# Patient Record
Sex: FEMALE | Race: WHITE | NOT HISPANIC OR LATINO | Employment: FULL TIME | URBAN - METROPOLITAN AREA
[De-identification: names, ages, dates, MRNs, and addresses within clinical notes are randomized per-mention and may not be internally consistent; named-entity substitution may affect disease eponyms.]

---

## 2023-02-06 ENCOUNTER — TRANSFERRED RECORDS (OUTPATIENT)
Dept: MULTI SPECIALTY CLINIC | Facility: CLINIC | Age: 28
End: 2023-02-06

## 2023-02-06 LAB — PAP-ABSTRACT: NORMAL

## 2023-10-02 ENCOUNTER — HOSPITAL ENCOUNTER (EMERGENCY)
Facility: CLINIC | Age: 28
Discharge: HOME | End: 2023-10-03
Attending: EMERGENCY MEDICINE | Admitting: EMERGENCY MEDICINE
Payer: COMMERCIAL

## 2023-10-02 DIAGNOSIS — K60.30 PERIANAL FISTULA: ICD-10-CM

## 2023-10-02 DIAGNOSIS — K61.0 PERIANAL ABSCESS: ICD-10-CM

## 2023-10-02 PROCEDURE — 99285 EMERGENCY DEPT VISIT HI MDM: CPT | Mod: 25

## 2023-10-02 NOTE — ED TRIAGE NOTES
Pt arrives from  for a rectal abscess. States she has a palpable lump that iscausing 8/10 pain. Denies fevers. No drainage noted. ABCs intact.     BP (!) 133/93   Pulse 101   Temp 98.2  F (36.8  C) (Oral)   Resp 20   Wt 60.8 kg (134 lb 0.6 oz)   SpO2 100%        Triage Assessment       Row Name 10/02/23 5143       Triage Assessment (Adult)    Airway WDL WDL       Respiratory WDL    Respiratory WDL WDL       Cardiac WDL    Cardiac WDL WDL       Cognitive/Neuro/Behavioral WDL    Cognitive/Neuro/Behavioral WDL WDL

## 2023-10-03 ENCOUNTER — APPOINTMENT (OUTPATIENT)
Dept: CT IMAGING | Facility: CLINIC | Age: 28
End: 2023-10-03
Attending: EMERGENCY MEDICINE
Payer: COMMERCIAL

## 2023-10-03 ENCOUNTER — NURSE TRIAGE (OUTPATIENT)
Dept: NURSING | Facility: CLINIC | Age: 28
End: 2023-10-03

## 2023-10-03 ENCOUNTER — TELEPHONE (OUTPATIENT)
Dept: SURGERY | Facility: CLINIC | Age: 28
End: 2023-10-03

## 2023-10-03 ENCOUNTER — OFFICE VISIT (OUTPATIENT)
Dept: SURGERY | Facility: CLINIC | Age: 28
End: 2023-10-03
Payer: COMMERCIAL

## 2023-10-03 ENCOUNTER — PATIENT OUTREACH (OUTPATIENT)
Dept: SURGERY | Facility: CLINIC | Age: 28
End: 2023-10-03

## 2023-10-03 VITALS
HEIGHT: 65 IN | OXYGEN SATURATION: 99 % | DIASTOLIC BLOOD PRESSURE: 71 MMHG | SYSTOLIC BLOOD PRESSURE: 121 MMHG | BODY MASS INDEX: 22.31 KG/M2 | HEART RATE: 84 BPM

## 2023-10-03 VITALS
SYSTOLIC BLOOD PRESSURE: 117 MMHG | OXYGEN SATURATION: 97 % | DIASTOLIC BLOOD PRESSURE: 77 MMHG | WEIGHT: 134.04 LBS | HEART RATE: 76 BPM | TEMPERATURE: 98.2 F | RESPIRATION RATE: 20 BRPM

## 2023-10-03 DIAGNOSIS — K61.0 PERIANAL ABSCESS: ICD-10-CM

## 2023-10-03 DIAGNOSIS — K60.30 PERIANAL FISTULA: Primary | ICD-10-CM

## 2023-10-03 LAB
ANION GAP SERPL CALCULATED.3IONS-SCNC: 12 MMOL/L (ref 7–15)
BASO+EOS+MONOS # BLD AUTO: ABNORMAL 10*3/UL
BASO+EOS+MONOS NFR BLD AUTO: ABNORMAL %
BASOPHILS # BLD AUTO: 0.1 10E3/UL (ref 0–0.2)
BASOPHILS NFR BLD AUTO: 0 %
BUN SERPL-MCNC: 11.2 MG/DL (ref 6–20)
CALCIUM SERPL-MCNC: 9.4 MG/DL (ref 8.6–10)
CHLORIDE SERPL-SCNC: 102 MMOL/L (ref 98–107)
CREAT SERPL-MCNC: 0.73 MG/DL (ref 0.51–0.95)
DEPRECATED HCO3 PLAS-SCNC: 20 MMOL/L (ref 22–29)
EGFRCR SERPLBLD CKD-EPI 2021: >90 ML/MIN/1.73M2
EOSINOPHIL # BLD AUTO: 0.1 10E3/UL (ref 0–0.7)
EOSINOPHIL NFR BLD AUTO: 1 %
ERYTHROCYTE [DISTWIDTH] IN BLOOD BY AUTOMATED COUNT: 12.5 % (ref 10–15)
GLUCOSE SERPL-MCNC: 87 MG/DL (ref 70–99)
HCG SER QL IA.RAPID: NEGATIVE
HCT VFR BLD AUTO: 40 % (ref 35–47)
HGB BLD-MCNC: 13.4 G/DL (ref 11.7–15.7)
IMM GRANULOCYTES # BLD: 0.1 10E3/UL
IMM GRANULOCYTES NFR BLD: 0 %
LYMPHOCYTES # BLD AUTO: 2 10E3/UL (ref 0.8–5.3)
LYMPHOCYTES NFR BLD AUTO: 13 %
MCH RBC QN AUTO: 32.1 PG (ref 26.5–33)
MCHC RBC AUTO-ENTMCNC: 33.5 G/DL (ref 31.5–36.5)
MCV RBC AUTO: 96 FL (ref 78–100)
MONOCYTES # BLD AUTO: 0.9 10E3/UL (ref 0–1.3)
MONOCYTES NFR BLD AUTO: 6 %
NEUTROPHILS # BLD AUTO: 11.7 10E3/UL (ref 1.6–8.3)
NEUTROPHILS NFR BLD AUTO: 80 %
NRBC # BLD AUTO: 0 10E3/UL
NRBC BLD AUTO-RTO: 0 /100
PLATELET # BLD AUTO: 316 10E3/UL (ref 150–450)
POTASSIUM SERPL-SCNC: 3.5 MMOL/L (ref 3.4–5.3)
RBC # BLD AUTO: 4.18 10E6/UL (ref 3.8–5.2)
SODIUM SERPL-SCNC: 134 MMOL/L (ref 135–145)
WBC # BLD AUTO: 14.8 10E3/UL (ref 4–11)

## 2023-10-03 PROCEDURE — 84703 CHORIONIC GONADOTROPIN ASSAY: CPT

## 2023-10-03 PROCEDURE — 250N000009 HC RX 250: Performed by: EMERGENCY MEDICINE

## 2023-10-03 PROCEDURE — 96374 THER/PROPH/DIAG INJ IV PUSH: CPT | Mod: 59

## 2023-10-03 PROCEDURE — 250N000011 HC RX IP 250 OP 636: Performed by: EMERGENCY MEDICINE

## 2023-10-03 PROCEDURE — 36415 COLL VENOUS BLD VENIPUNCTURE: CPT | Performed by: EMERGENCY MEDICINE

## 2023-10-03 PROCEDURE — 74177 CT ABD & PELVIS W/CONTRAST: CPT

## 2023-10-03 PROCEDURE — 85025 COMPLETE CBC W/AUTO DIFF WBC: CPT | Performed by: EMERGENCY MEDICINE

## 2023-10-03 PROCEDURE — 76857 US EXAM PELVIC LIMITED: CPT

## 2023-10-03 PROCEDURE — 80048 BASIC METABOLIC PNL TOTAL CA: CPT | Performed by: EMERGENCY MEDICINE

## 2023-10-03 RX ORDER — LIDOCAINE HYDROCHLORIDE AND EPINEPHRINE 10; 10 MG/ML; UG/ML
5 INJECTION, SOLUTION INFILTRATION; PERINEURAL ONCE
Status: DISCONTINUED | OUTPATIENT
Start: 2023-10-03 | End: 2023-10-03 | Stop reason: HOSPADM

## 2023-10-03 RX ORDER — OXYCODONE HYDROCHLORIDE 5 MG/1
5 TABLET ORAL EVERY 6 HOURS PRN
Qty: 5 TABLET | Refills: 0 | Status: SHIPPED | OUTPATIENT
Start: 2023-10-03 | End: 2024-01-30

## 2023-10-03 RX ORDER — IOPAMIDOL 755 MG/ML
500 INJECTION, SOLUTION INTRAVASCULAR ONCE
Status: COMPLETED | OUTPATIENT
Start: 2023-10-03 | End: 2023-10-03

## 2023-10-03 RX ORDER — AMPICILLIN AND SULBACTAM 2; 1 G/1; G/1
3 INJECTION, POWDER, FOR SOLUTION INTRAMUSCULAR; INTRAVENOUS EVERY 6 HOURS
Status: DISCONTINUED | OUTPATIENT
Start: 2023-10-03 | End: 2023-10-03 | Stop reason: HOSPADM

## 2023-10-03 RX ADMIN — AMPICILLIN SODIUM AND SULBACTAM SODIUM 3 G: 2; 1 INJECTION, POWDER, FOR SOLUTION INTRAMUSCULAR; INTRAVENOUS at 02:32

## 2023-10-03 RX ADMIN — SODIUM CHLORIDE 57 ML: 9 INJECTION, SOLUTION INTRAVENOUS at 00:58

## 2023-10-03 RX ADMIN — IOPAMIDOL 68 ML: 755 INJECTION, SOLUTION INTRAVENOUS at 00:57

## 2023-10-03 ASSESSMENT — PAIN SCALES - GENERAL: PAINLEVEL: MODERATE PAIN (4)

## 2023-10-03 ASSESSMENT — ACTIVITIES OF DAILY LIVING (ADL): ADLS_ACUITY_SCORE: 35

## 2023-10-03 NOTE — LETTER
"10/3/2023       RE: Elena Cole   [place] [address]     Dear Colleague,    Thank you for referring your patient, Elena Cole, to the [facility] COLON AND RECTAL SURGERY [facility]INIC [place] [facility]. Please see a copy of my visit note below.    Colon and Rectal Surgery Consult Clinic Note    Date: 10/3/2023     Referring provider:  Saturnino Pitt MD  [facility]  [address]  [place] [address]     RE: Elena Cole  : 1995  JOSIAH: 10/3/2023    Elena Cole is a very pleasant 28 year old female here for pilonidal abscess.    HPI:  Elena was in the ER yesterday for perianal abscess and was started on antibiotics.  She was having some increasing pain for the past 2 weeks but was hoping this was just a hemorrhoid.  Pain got progressively worse.  No fevers or chills.  She has never had anything like this in the past.  No difficulty with bowel movements.  CT AP 10/3/23:  IMPRESSION:   1.  Perianal fistula which extends from approximately the 3:00 position of the anorectum into the left aspect of the perineum where there is some partially rim-enhancing fluid and fat stranding, compatible with phlegmon/forming abscess. No drainable   fluid collection currently.  2.  Also noted is a diffusely thickened urinary bladder, suggestive of cystitis. Recommend correlation with urinalysis.  WBC 14.8 yesterday    Physical Examination:  /71 (BP Location: Left arm, Patient Position: Sitting, Cuff Size: Adult Regular)   Pulse 84   Ht 5' 5\"   SpO2 99%   BMI 22.31 kg/m    General: alert, oriented, in no acute distress, sitting comfortably  HEENT: mucous membranes moist    Perianal external examination: Exam was chaperoned by Justice Chang, EMT-P   Induration and fluctuance in the left anterior position. No erythema and no drainage present.    Digital rectal examination: Was deferred    Anoscopy: " Was deferred    Procedures:  Prior to the start of the procedure and with procedural staff participation, I verbally confirmed the patient s identity using two indicators, relevant allergies, that the procedure was appropriate and matched the consent or emergent situation, and that the correct equipment/implants were available. Immediately prior to starting the procedure I conducted the Time Out with the procedural staff and re-confirmed the patient s name, procedure, and site/side. (The Joint Commission universal protocol was followed.)  Yes    Sedation (Moderate or Deep): None    Effected area cleaned with betadine. 1% lidocaine with epineprhine used to infiltrate the area with good anethesia. Scapel used to make a cruciate incision and edges trimmed. A moderate amount of purulent drainage was removed. No gauze was needed for packing. External gauze dressing applied. Pt tolerated preocedure well. No additional loculations noted.    Procedure was performed under collaborating agreement with Dr. Filiberto Martinez MD, Chief of the Division of Colon and Rectal Surgery    Assessment/Plan: 28 year old female with perianal abscess. Discussed drainage at the bedside today. The patient was informed of the risks of bleeding, incomplete drainage of infection requiring additional procedures/surgery, development of fistula and need for future operations. She expressed understanding and wished to proceed. Would like her to do warm tub baths a few times a day and clean with a sondra squeeze bottle after bowel movements. No packing needed but she can keep a gauze between her buttocks to wick away moisture. Fistula on CT but will wait to see if she has recurrent abscess or fails to heal before pursuing treatment for fistula as this is he first occurrence of abscess Patient's questions were answered to her stated satisfaction and she is in agreement with this plan.     Medical history:  No past medical history on file.    Surgical  "history:  No past surgical history on file.    Problem list:  There are no problems to display for this patient.      Medications:  Current Outpatient Medications   Medication Sig Dispense Refill    amoxicillin-clavulanate (AUGMENTIN) 875-125 MG tablet Take 1 tablet by mouth 2 times daily for 7 days 14 tablet 0       Allergies:  No Known Allergies    Family history:  No family history on file.    Social history:  Social History     Tobacco Use    Smoking status: Not on file    Smokeless tobacco: Not on file   Substance Use Topics    Alcohol use: Not on file    Marital status: .  Occupation: ultrasound tech    Nursing Notes:   Justice Chang, EMT  10/3/2023 10:16 AM  Signed  Chief Complaint   Patient presents with    New Patient     Abscess       Vitals:    10/03/23 1012   BP: 121/71   BP Location: Left arm   Patient Position: Sitting   Cuff Size: Adult Regular   Pulse: 84   SpO2: 99%   Height: 5' 5\"       Body mass index is 22.31 kg/m .     BANDAR Carmichael- P       15 minutes spent on the date of encounter performing chart review, history and exam, documentation and further activities as noted above with an additional 10 minutes for the procedure.       This note was created using speech recognition software and may contain unintended word substitutions.        Again, thank you for allowing me to participate in the care of your patient.      Sincerely,    SULEIMAN Daniels CNP    "

## 2023-10-03 NOTE — TELEPHONE ENCOUNTER
Pt called for complain of pain after visit with Colorectal today..  Patient just as we were getting started, she received an incoming call and disconnected our call.  She will call us back if this is not the clinic returning her call.

## 2023-10-03 NOTE — NURSING NOTE
"Chief Complaint   Patient presents with    New Patient     Abscess       Vitals:    10/03/23 1012   BP: 121/71   BP Location: Left arm   Patient Position: Sitting   Cuff Size: Adult Regular   Pulse: 84   SpO2: 99%   Height: 5' 5\"       Body mass index is 22.31 kg/m .     Justice Chang, EMT- P    "

## 2023-10-03 NOTE — ED PROVIDER NOTES
[facility] ED Provider Note  [facility]  12:19 AM  10/3/2023    Elena Cole  28 year oldfemale    Chief Complaint   Patient presents with    Rectal/perineal Pain       HPI:    28-year-old female otherwise healthy here with 2 weeks of perianal pain worse over the last 2 to 3 days increasing size and corresponding pain.  No drainage.  No fever.  Bowel movements have been normal without melena or hematochezia.  No dysuria.  Denies any chance of being pregnant.  No previous colorectal surgery.  Was at urgent care earlier today and sent over for further evaluation.            Independent Historian:     None     Review of External Notes:   Urgent care note reviewed with the presenting symptoms as well as recommendation of coming over for evaluation of possible incision and drainage.          ROS: ROS is negative other than mentioned above in HPI      Pertinent PMH/PSH:     None         No current outpatient medications        No Known Allergies      Physical Exam  /77   Pulse 76   Temp 98.2  F (36.8  C) (Oral)   Resp 20   Wt 60.8 kg (134 lb 0.6 oz)   SpO2 97%       Rectal: Exam done with female chaperone in the room.  Left lateral perianal space there is approximately 3 x 2 cm area of induration subtle erythema and tenderness to palpation.  No pustular spontaneous drainage.    CV: ppi, regular   Resp: speaking in full sentences without any resp distress  Skin: warm dry well perfused  Neuro: Alert, no gross motor or sensory deficits,  gait stable        Labs and Imaging:    Labs Ordered and Resulted from Time of ED Arrival to Time of ED Departure   BASIC METABOLIC PANEL - Abnormal       Result Value    Sodium 134 (*)     Potassium 3.5      Chloride 102      Carbon Dioxide (CO2) 20 (*)     Anion Gap 12      Urea Nitrogen 11.2      Creatinine 0.73      GFR Estimate >90      Calcium 9.4      Glucose 87     CBC WITH PLATELETS AND DIFFERENTIAL - Abnormal    WBC Count 14.8 (*)     RBC Count  4.18      Hemoglobin 13.4      Hematocrit 40.0      MCV 96      MCH 32.1      MCHC 33.5      RDW 12.5      Platelet Count 316      % Neutrophils 80      % Lymphocytes 13      % Monocytes 6      Mids % (Monos, Eos, Basos)        % Eosinophils 1      % Basophils 0      % Immature Granulocytes 0      NRBCs per 100 WBC 0      Absolute Neutrophils 11.7 (*)     Absolute Lymphocytes 2.0      Absolute Monocytes 0.9      Mids Abs (Monos, Eos, Basos)        Absolute Eosinophils 0.1      Absolute Basophils 0.1      Absolute Immature Granulocytes 0.1      Absolute NRBCs 0.0     ISTAT HCG QUALITATIVE PREGNANCY POCT - Normal    HCG Qualitative POCT Negative           Abd/pelvis CT,  IV  contrast only TRAUMA / AAA   Final Result   IMPRESSION:    1.  Perianal fistula which extends from approximately the 3:00 position of the anorectum into the left aspect of the perineum where there is some partially rim-enhancing fluid and fat stranding, compatible with phlegmon/forming abscess. No drainable    fluid collection currently.   2.  Also noted is a diffusely thickened urinary bladder, suggestive of cystitis. Recommend correlation with urinalysis.      POC US SOFT TISSUE    (Results Pending)           Medications Administered in ED:  Medications   iopamidol (ISOVUE-370) solution 500 mL (68 mLs Intravenous $Given 10/3/23 0057)   CT scan flush (57 mLs Intravenous $Given 10/3/23 0058)             Independent Interpretation (X-rays, CTs, rhythm strip):    None      Consultations/Discussion of Management or Tests:    None     Social Determinants of Health affecting care:    None           Medical Decision Makin-year-old female here with a perianal fistula and perianal phlegmon versus abscess.  We discussed the diagnosis as well as treatment plan including antibiotics and colorectal surgery referral.  We also discussed aspiration and I&D of the perianal abscess here in the emergency department.  We looked at the ultrasound together and  she would prefer to do antibiotics alone for now follow-up with colorectal surgery with the goal of 1 procedure rather than to possible separate procedures.  She understands risk benefits alternative has capacity to make her own decisions and will return if new or worsening symptoms.      Diagnosis:    ICD-10-CM    1. Perianal fistula  K60.3 Adult Colorectal Surgery  Referral      2. Perianal abscess  K61.0 Adult Colorectal Surgery  Referral          Disposition:  home      Saturnino Pitt MD  [facility]  [facility]       Saturnino Pitt MD  10/03/23 0725

## 2023-10-03 NOTE — PROGRESS NOTES
"Colon and Rectal Surgery Consult Clinic Note    Date: 10/3/2023     Referring provider:  Saturnino Pitt MD  [facility]  [address]  [place] [address]     RE: Elena Cole  : 1995  JOSIAH: 10/3/2023    Elena Cole is a very pleasant 28 year old female here for pilonidal abscess.    HPI:  Elena was in the ER yesterday for perianal abscess and was started on antibiotics.  She was having some increasing pain for the past 2 weeks but was hoping this was just a hemorrhoid.  Pain got progressively worse.  No fevers or chills.  She has never had anything like this in the past.  No difficulty with bowel movements.  CT AP 10/3/23:  IMPRESSION:   1.  Perianal fistula which extends from approximately the 3:00 position of the anorectum into the left aspect of the perineum where there is some partially rim-enhancing fluid and fat stranding, compatible with phlegmon/forming abscess. No drainable   fluid collection currently.  2.  Also noted is a diffusely thickened urinary bladder, suggestive of cystitis. Recommend correlation with urinalysis.  WBC 14.8 yesterday    Physical Examination:  /71 (BP Location: Left arm, Patient Position: Sitting, Cuff Size: Adult Regular)   Pulse 84   Ht 5' 5\"   SpO2 99%   BMI 22.31 kg/m    General: alert, oriented, in no acute distress, sitting comfortably  HEENT: mucous membranes moist    Perianal external examination: Exam was chaperoned by Justice Chang, EMT-P   Induration and fluctuance in the left anterior position. No erythema and no drainage present.    Digital rectal examination: Was deferred    Anoscopy: Was deferred    Procedures:  Prior to the start of the procedure and with procedural staff participation, I verbally confirmed the patient s identity using two indicators, relevant allergies, that the procedure was appropriate and matched the consent or emergent situation, and that the correct equipment/implants were " available. Immediately prior to starting the procedure I conducted the Time Out with the procedural staff and re-confirmed the patient s name, procedure, and site/side. (The Joint Commission universal protocol was followed.)  Yes    Sedation (Moderate or Deep): None    Effected area cleaned with betadine. 1% lidocaine with epineprhine used to infiltrate the area with good anethesia. Scapel used to make a cruciate incision and edges trimmed. A moderate amount of purulent drainage was removed. No gauze was needed for packing. External gauze dressing applied. Pt tolerated preocedure well. No additional loculations noted.    Procedure was performed under collaborating agreement with Dr. Filiberto Martinez MD, Chief of the Division of Colon and Rectal Surgery    Assessment/Plan: 28 year old female with perianal abscess. Discussed drainage at the bedside today. The patient was informed of the risks of bleeding, incomplete drainage of infection requiring additional procedures/surgery, development of fistula and need for future operations. She expressed understanding and wished to proceed. Would like her to do warm tub baths a few times a day and clean with a sondra squeeze bottle after bowel movements. No packing needed but she can keep a gauze between her buttocks to wick away moisture. Fistula on CT but will wait to see if she has recurrent abscess or fails to heal before pursuing treatment for fistula as this is he first occurrence of abscess Patient's questions were answered to her stated satisfaction and she is in agreement with this plan.     Medical history:  No past medical history on file.    Surgical history:  No past surgical history on file.    Problem list:  There are no problems to display for this patient.      Medications:  Current Outpatient Medications   Medication Sig Dispense Refill    amoxicillin-clavulanate (AUGMENTIN) 875-125 MG tablet Take 1 tablet by mouth 2 times daily for 7 days 14 tablet 0  "      Allergies:  No Known Allergies    Family history:  No family history on file.    Social history:  Social History     Tobacco Use    Smoking status: Not on file    Smokeless tobacco: Not on file   Substance Use Topics    Alcohol use: Not on file    Marital status: .  Occupation: ultrasound tech    Nursing Notes:   Justice Chang, EMT  10/3/2023 10:16 AM  Signed  Chief Complaint   Patient presents with    New Patient     Abscess       Vitals:    10/03/23 1012   BP: 121/71   BP Location: Left arm   Patient Position: Sitting   Cuff Size: Adult Regular   Pulse: 84   SpO2: 99%   Height: 5' 5\"       Body mass index is 22.31 kg/m .     Justice Chang, EMT- P       15 minutes spent on the date of encounter performing chart review, history and exam, documentation and further activities as noted above with an additional 10 minutes for the procedure.     SULEIMAN Jose, NP-C  Colon and Rectal Surgery   [facility]    This note was created using speech recognition software and may contain unintended word substitutions.    "

## 2023-10-03 NOTE — TELEPHONE ENCOUNTER
"Elena is s/p perianal abscess incision and drainage today with Estefany Benedict NP. She reports her pain as \"very uncomfortable\", 8/10,. She is taking antibiotics, she is taking 1000mg tylenol every 4-6 hours, 800mg of ibuprofen 800mg q8h, Estefany Benedict NP ordered oxycodone 5mg (N=5) for breakthrough pain. She will continue this course and let us know if anything else is needed. Instructed to go to ED if pain get's unbearable.  "

## 2023-10-03 NOTE — PROGRESS NOTES
Perianal abscess triage note:     Patient was seen in the ER yesterday for a perianal abscess. It did not drain overnight. It is still very swollen and painful. She was placed on antibiotics. She wants to wait a few days to see if the antibiotics will resolve the abscess. I did schedule a visit this week to ensure things are getting better. NPO on Thursday.     CT  IMPRESSION:   1.  Perianal fistula which extends from approximately the 3:00 position of the anorectum into the left aspect of the perineum where there is some partially rim-enhancing fluid and fat stranding, compatible with phlegmon/forming abscess. No drainable fluid collection currently.  2.  Also noted is a diffusely thickened urinary bladder, suggestive of cystitis. Recommend correlation with urinalysis.    Addendum: pt called me back and would like to have the I and D today if possible. Added pt to schedule and made her aware of longer wait time and NPO status

## 2023-10-03 NOTE — TELEPHONE ENCOUNTER
[facility] Call Center    Phone Message    May a detailed message be left on voicemail: yes     Reason for Call: Other: Pt called, is having more pain than what was described after todays visit. Please call, (writer also transferred pt to triage nurse)      Action Taken: Message routed to:  [facility] colon and rectal     Travel Screening: Not Applicable

## 2023-10-03 NOTE — ADDENDUM NOTE
Addended by: RAVIN TREVINO on: 10/3/2023 04:39 PM     Modules accepted: Orders     Apixaban/Eliquis increases your risk for bleeding. Notify your doctor if you experience any of the following side effects: bleeding, coughing or vomiting blood, red or black stool, unexpected pain or swelling, itching or hives, chest pain, chest tightness, trouble breathing, changes in how much or how often you urinate, red or pink urine, numbness or tingling in your feet, or unusual muscle weakness. When Apixaban/Eliquis is taken with other medicines, they can affect how it works. Taking other medications such as aspirin, blood thinners, nonsteroidal anti-inflammatories, and medications that treat depression can increase your risk of bleeding. It is very important to tell your health care provider about all of the other medicines, including over-the-counter medications, herbs, and vitamins you are taking. DO NOT start, stop, or change the dosage of any medicine, including over-the-counter medicines, vitamins, and herbal products without your doctor’s approval. Any products containing aspirin or are nonsteroidal anti-inflammatories lessen the blood’s ability to form clots and add to the effect of Apixaban/Eliquis. Never take aspirin or medicines that contain aspirin without speaking to your doctor.

## 2023-10-12 ENCOUNTER — OFFICE VISIT (OUTPATIENT)
Dept: SURGERY | Facility: CLINIC | Age: 28
End: 2023-10-12
Payer: COMMERCIAL

## 2023-10-12 VITALS — HEART RATE: 100 BPM | DIASTOLIC BLOOD PRESSURE: 76 MMHG | OXYGEN SATURATION: 98 % | SYSTOLIC BLOOD PRESSURE: 108 MMHG

## 2023-10-12 DIAGNOSIS — K61.0 PERIANAL ABSCESS: Primary | ICD-10-CM

## 2023-10-12 PROCEDURE — 99024 POSTOP FOLLOW-UP VISIT: CPT | Performed by: NURSE PRACTITIONER

## 2023-10-12 ASSESSMENT — PAIN SCALES - GENERAL: PAINLEVEL: NO PAIN (0)

## 2023-10-12 NOTE — PROGRESS NOTES
Colon and Rectal Surgery Follow-Up Clinic Note    RE: Elena Cole  : 1995  JOSIAH: 10/12/2023    lEena Cole is a very pleasant 28 year old female with I&D of perianal abscess last week in clinic.    Interval history: Elena has been doing well. No pain. Drainage has been slowing down a lot. No fevers or chills. No difficulty with bowel movements. She has never had anything like this in the past but CT last with with concern for fistula.    Physical Examination: Exam was chaperoned by Justice Chang, EMT-P   /76 (BP Location: Left arm, Patient Position: Sitting, Cuff Size: Adult Regular)   Pulse 100   SpO2 98%   General: alert, oriented, in no acute distress, sitting comfortably  HEENT: mucous membranes moist    Perianal external examination:  I&D site in the left lateral position with some mild induration. No erythema. A small amount of serous drainage present.    Digital rectal examination: Was deferred.    Anoscopy: Was deferred.    Assessment/Plan: 28 year old female s/p I&D of perianal abscess in clinic last week.  She is healing quite well.  I would like her to continue to do warm tub bath several times a day to help facilitate further drainage.  We did discuss the possibility of an anal fistula and CT scan for concern for this last week before her I&D.  We will evaluate for fistula if she fails to heal or if abscess recurs. She can otherwise follow up as needed. Patient's questions were answered to her stated satisfaction and she is in agreement with this plan.     Medical history:  No past medical history on file.    Surgical history:  No past surgical history on file.    Problem list:  There are no problems to display for this patient.      Medications:  Current Outpatient Medications   Medication Sig Dispense Refill    oxyCODONE (ROXICODONE) 5 MG tablet Take 1 tablet (5 mg) by mouth every 6 hours as needed for pain 5 tablet 0       Allergies:  No Known Allergies    Family history:  No  family history on file.    Social history:  Social History     Tobacco Use    Smoking status: Not on file    Smokeless tobacco: Not on file   Substance Use Topics    Alcohol use: Not on file     Marital status: .    Nursing Notes:   Justice Chang, EMT  10/12/2023 10:54 AM  Signed  Chief Complaint   Patient presents with    RECHECK     Abscess recheck       Vitals:    10/12/23 1052   BP: 108/76   BP Location: Left arm   Patient Position: Sitting   Cuff Size: Adult Regular   Pulse: 100   SpO2: 98%       There is no height or weight on file to calculate BMI.     Justice Chang, EMT- P      15 minutes spent on the date of encounter performing chart review, history and exam, documentation and further activities as noted above     Estefany Benedict NP-C  Colon and Rectal Surgery  [facility]

## 2023-10-12 NOTE — LETTER
10/12/2023       RE: Elena Cole   [place] [address]     Dear Colleague,    Thank you for referring your patient, Elena Cole, to the [facility] COLON AND RECTAL SURGERY CLINIC [place] at . Please see a copy of my visit note below.    Colon and Rectal Surgery Follow-Up Clinic Note    RE: Elena Cole  : 1995  JOSIAH: 10/12/2023    Elena Cole is a very pleasant 28 year old female with I&D of perianal abscess last week in clinic.    Interval history: Elena has been doing well. No pain. Drainage has been slowing down a lot. No fevers or chills. No difficulty with bowel movements. She has never had anything like this in the past but CT last with with concern for fistula.    Physical Examination: Exam was chaperoned by Justice Chang, EMT-P   /76 (BP Location: Left arm, Patient Position: Sitting, Cuff Size: Adult Regular)   Pulse 100   SpO2 98%   General: alert, oriented, in no acute distress, sitting comfortably  HEENT: mucous membranes moist    Perianal external examination:  I&D site in the left lateral position with some mild induration. No erythema. A small amount of serous drainage present.    Digital rectal examination: Was deferred.    Anoscopy: Was deferred.    Assessment/Plan: 28 year old female s/p I&D of perianal abscess in clinic last week.  She is healing quite well.  I would like her to continue to do warm tub bath several times a day to help facilitate further drainage.  We did discuss the possibility of an anal fistula and CT scan for concern for this last week before her I&D.  We will evaluate for fistula if she fails to heal or if abscess recurs. She can otherwise follow up as needed. Patient's questions were answered to her stated satisfaction and she is in agreement with this plan.     Medical history:  No past medical history on file.    Surgical history:  No past surgical history on  file.    Problem list:  There are no problems to display for this patient.      Medications:  Current Outpatient Medications   Medication Sig Dispense Refill    oxyCODONE (ROXICODONE) 5 MG tablet Take 1 tablet (5 mg) by mouth every 6 hours as needed for pain 5 tablet 0       Allergies:  No Known Allergies    Family history:  No family history on file.    Social history:  Social History     Tobacco Use    Smoking status: Not on file    Smokeless tobacco: Not on file   Substance Use Topics    Alcohol use: Not on file     Marital status: .    Nursing Notes:   Justice Chang, EMT  10/12/2023 10:54 AM  Signed  Chief Complaint   Patient presents with    RECHECK     Abscess recheck       Vitals:    10/12/23 1052   BP: 108/76   BP Location: Left arm   Patient Position: Sitting   Cuff Size: Adult Regular   Pulse: 100   SpO2: 98%       There is no height or weight on file to calculate BMI.     Justice Chang, EMT- P      15 minutes spent on the date of encounter performing chart review, history and exam, documentation and further activities as noted above         Again, thank you for allowing me to participate in the care of your patient.      Sincerely,    SULEIMAN Daniels CNP

## 2023-10-23 ENCOUNTER — OFFICE VISIT (OUTPATIENT)
Dept: SURGERY | Facility: CLINIC | Age: 28
End: 2023-10-23
Payer: COMMERCIAL

## 2023-10-23 VITALS
BODY MASS INDEX: 22.31 KG/M2 | SYSTOLIC BLOOD PRESSURE: 116 MMHG | HEART RATE: 82 BPM | OXYGEN SATURATION: 98 % | HEIGHT: 65 IN | TEMPERATURE: 98.2 F | DIASTOLIC BLOOD PRESSURE: 74 MMHG

## 2023-10-23 DIAGNOSIS — K60.30 ANAL FISTULA: Primary | ICD-10-CM

## 2023-10-23 NOTE — LETTER
"10/23/2023       RE: Elena Cole   [place] [address]     Dear Colleague,    Thank you for referring your patient, Elena Cole, to the [facility] COLON AND RECTAL SURGERY CLINIC [place] at [facility]. Please see a copy of my visit note below.    Colon and Rectal Surgery Follow-Up Clinic Note    RE: Elena Cole  : 1995  JOSIAH: 10/23/2023    Elena Cole is a very pleasant 28 year old female with I&D of perianal abscess 10/3/23 in clinic.    Interval history: Elena has had some continued drainage. She felt like there was some swelling and pushed on this site last week and had drainage from her anus. No fevers or chills. No difficulty with bowel movements. She has never had anything like this in the past but CT prior to her I&D with with concern for fistula.    Physical Examination: Exam was chaperoned by Justice Chang, EMT-P   /74 (BP Location: Left arm, Patient Position: Sitting, Cuff Size: Adult Regular)   Pulse 82   Temp 98.2  F (36.8  C) (Oral)   Ht 5' 5\"   SpO2 98%   BMI 22.31 kg/m    General: alert, oriented, in no acute distress, sitting comfortably  HEENT: mucous membranes moist    Perianal external examination:  I&D site in the left lateral position with some mild induration that feels like it tracts posteriorly. External site is not open but a small pustule is present. No erythema.     Digital rectal examination: Was performed with a small ulceration present in the left lateral position.    Anoscopy: Was performed with what appears to be the internal fistula opening in the left lateral position without drainage or bleeding. No significant internal hemorrhoids.    Assessment/Plan: 28 year old female s/p I&D of perianal abscess in clinic 10/3/23. Having continued drainage and what appears to be an anal fistula. We had a long discussion regarding treatment including possible fistulotomy and possible seton " "placement depending on muscle involvement. Discussed that if a seton is placed that they could need further surgical intervention and if no tract is able to be found at the time of surgery that we may need to get further workup and imaging. Discussed the risks of developing an abscess and asked them to return to clinic if they develop any worsening symptoms. They stated an understanding of this and wishes to proceed with surgical intervention. Encouraged her to contact the clinic in the meantime with any worsening symptoms, questions, or concerns. Patient's questions were answered to her stated satisfaction and she is in agreement with this plan.       Medical history:  No past medical history on file.    Surgical history:  No past surgical history on file.    Problem list:  There are no problems to display for this patient.      Medications:  Current Outpatient Medications   Medication Sig Dispense Refill    oxyCODONE (ROXICODONE) 5 MG tablet Take 1 tablet (5 mg) by mouth every 6 hours as needed for pain 5 tablet 0       Allergies:  No Known Allergies    Family history:  No family history on file.    Social history:  Social History     Tobacco Use    Smoking status: Not on file    Smokeless tobacco: Not on file   Substance Use Topics    Alcohol use: Not on file     Marital status: .    Nursing Notes:   Justice Chang, EMT  10/23/2023  3:22 PM  Signed  Chief Complaint   Patient presents with    RECHECK     Possible Fistula       Vitals:    10/23/23 1518   BP: 116/74   BP Location: Left arm   Patient Position: Sitting   Cuff Size: Adult Regular   Pulse: 82   Temp: 98.2  F (36.8  C)   TempSrc: Oral   SpO2: 98%   Height: 5' 5\"   Patient endorsed new discomfort around abscess as well as increased drainage from it an now also from anus.    Body mass index is 22.31 kg/m .     Justice Chang, EMT- P      15 minutes spent on the date of encounter performing chart review, history and exam, documentation and further " activities as noted above         Again, thank you for allowing me to participate in the care of your patient.      Sincerely,    SULEIMAN Daniels CNP

## 2023-10-23 NOTE — NURSING NOTE
"Chief Complaint   Patient presents with    RECHECK     Possible Fistula       Vitals:    10/23/23 1518   BP: 116/74   BP Location: Left arm   Patient Position: Sitting   Cuff Size: Adult Regular   Pulse: 82   Temp: 98.2  F (36.8  C)   TempSrc: Oral   SpO2: 98%   Height: 5' 5\"   Patient endorsed new discomfort around abscess as well as increased drainage from it an now also from anus.    Body mass index is 22.31 kg/m .     Justice Chang, EMT- P    "

## 2023-10-23 NOTE — PROGRESS NOTES
"Colon and Rectal Surgery Follow-Up Clinic Note    RE: Elena Cole  : 1995  JOSIAH: 10/23/2023    Elena Cole is a very pleasant 28 year old female with I&D of perianal abscess 10/3/23 in clinic.    Interval history: Elena has had some continued drainage. She felt like there was some swelling and pushed on this site last week and had drainage from her anus. No fevers or chills. No difficulty with bowel movements. She has never had anything like this in the past but CT prior to her I&D with with concern for fistula.    Physical Examination: Exam was chaperoned by Justice Chang, EMT-P   /74 (BP Location: Left arm, Patient Position: Sitting, Cuff Size: Adult Regular)   Pulse 82   Temp 98.2  F (36.8  C) (Oral)   Ht 5' 5\"   SpO2 98%   BMI 22.31 kg/m    General: alert, oriented, in no acute distress, sitting comfortably  HEENT: mucous membranes moist    Perianal external examination:  I&D site in the left lateral position with some mild induration that feels like it tracts posteriorly. External site is not open but a small pustule is present. No erythema.     Digital rectal examination: Was performed with a small ulceration present in the left lateral position.    Anoscopy: Was performed with what appears to be the internal fistula opening in the left lateral position without drainage or bleeding. No significant internal hemorrhoids.    Assessment/Plan: 28 year old female s/p I&D of perianal abscess in clinic 10/3/23. Having continued drainage and what appears to be an anal fistula. We had a long discussion regarding treatment including possible fistulotomy and possible seton placement depending on muscle involvement. Discussed that if a seton is placed that they could need further surgical intervention and if no tract is able to be found at the time of surgery that we may need to get further workup and imaging. Discussed the risks of developing an abscess and asked them to return to clinic if they " "develop any worsening symptoms. They stated an understanding of this and wishes to proceed with surgical intervention. Encouraged her to contact the clinic in the meantime with any worsening symptoms, questions, or concerns. Patient's questions were answered to her stated satisfaction and she is in agreement with this plan.       Medical history:  No past medical history on file.    Surgical history:  No past surgical history on file.    Problem list:  There are no problems to display for this patient.      Medications:  Current Outpatient Medications   Medication Sig Dispense Refill    oxyCODONE (ROXICODONE) 5 MG tablet Take 1 tablet (5 mg) by mouth every 6 hours as needed for pain 5 tablet 0       Allergies:  No Known Allergies    Family history:  No family history on file.    Social history:  Social History     Tobacco Use    Smoking status: Not on file    Smokeless tobacco: Not on file   Substance Use Topics    Alcohol use: Not on file     Marital status: .    Nursing Notes:   Justice Chang, EMT  10/23/2023  3:22 PM  Signed  Chief Complaint   Patient presents with    RECHECK     Possible Fistula       Vitals:    10/23/23 1518   BP: 116/74   BP Location: Left arm   Patient Position: Sitting   Cuff Size: Adult Regular   Pulse: 82   Temp: 98.2  F (36.8  C)   TempSrc: Oral   SpO2: 98%   Height: 5' 5\"   Patient endorsed new discomfort around abscess as well as increased drainage from it an now also from anus.    Body mass index is 22.31 kg/m .     Justice Chang, EMT- P      15 minutes spent on the date of encounter performing chart review, history and exam, documentation and further activities as noted above     Estefany Benedict, NP-C  Colon and Rectal Surgery  [facility]    "

## 2023-10-27 ENCOUNTER — TELEPHONE (OUTPATIENT)
Dept: SURGERY | Facility: CLINIC | Age: 28
End: 2023-10-27
Payer: COMMERCIAL

## 2023-10-27 NOTE — TELEPHONE ENCOUNTER
On 10/27/2023 at 1:11 PM:    Patient is scheduled for surgery with Dr. James Mcdonough     Spoke with: Elena    Date of Surgery: Weds 12/6/2023    Location: Fistulotomy, possible seton placement    Informed patient they will need an adult  YES    Pre op with Provider Estefany Benedict NP     H&P: patient to schedule at PCP clinic [facility]    2-3 week post-op scheduled with Estefany Benedict NP     Surgery packet: sent via [employer] and Mail    Heavenly Dozier  Amanda-op Coordinator  [facility]  Direct Phone: [phone]

## 2023-12-05 ENCOUNTER — ANESTHESIA EVENT (OUTPATIENT)
Dept: SURGERY | Facility: AMBULATORY SURGERY CENTER | Age: 28
End: 2023-12-05
Payer: COMMERCIAL

## 2023-12-06 ENCOUNTER — HOSPITAL ENCOUNTER (OUTPATIENT)
Facility: AMBULATORY SURGERY CENTER | Age: 28
Discharge: HOME | End: 2023-12-06
Attending: SURGERY | Admitting: SURGERY
Payer: COMMERCIAL

## 2023-12-06 ENCOUNTER — ANESTHESIA (OUTPATIENT)
Dept: SURGERY | Facility: AMBULATORY SURGERY CENTER | Age: 28
End: 2023-12-06
Payer: COMMERCIAL

## 2023-12-06 VITALS
HEART RATE: 92 BPM | WEIGHT: 135 LBS | DIASTOLIC BLOOD PRESSURE: 73 MMHG | OXYGEN SATURATION: 100 % | SYSTOLIC BLOOD PRESSURE: 121 MMHG | TEMPERATURE: 97.6 F | BODY MASS INDEX: 22.49 KG/M2 | HEIGHT: 65 IN | RESPIRATION RATE: 16 BRPM

## 2023-12-06 LAB
HCG UR QL: NEGATIVE
INTERNAL QC OK POCT: NORMAL
POCT KIT EXPIRATION DATE: NORMAL
POCT KIT LOT NUMBER: NORMAL

## 2023-12-06 PROCEDURE — 46020 PLACEMENT OF SETON: CPT

## 2023-12-06 PROCEDURE — 81025 URINE PREGNANCY TEST: CPT | Performed by: PATHOLOGY

## 2023-12-06 PROCEDURE — 46020 PLACEMENT OF SETON: CPT | Performed by: SURGERY

## 2023-12-06 RX ORDER — PROPOFOL 10 MG/ML
INJECTION, EMULSION INTRAVENOUS PRN
Status: DISCONTINUED | OUTPATIENT
Start: 2023-12-06 | End: 2023-12-06

## 2023-12-06 RX ORDER — ACETAMINOPHEN 325 MG/1
975 TABLET ORAL ONCE
Status: COMPLETED | OUTPATIENT
Start: 2023-12-06 | End: 2023-12-06

## 2023-12-06 RX ORDER — PROPOFOL 10 MG/ML
INJECTION, EMULSION INTRAVENOUS CONTINUOUS PRN
Status: DISCONTINUED | OUTPATIENT
Start: 2023-12-06 | End: 2023-12-06

## 2023-12-06 RX ORDER — OXYCODONE HYDROCHLORIDE 5 MG/1
10 TABLET ORAL
Status: DISCONTINUED | OUTPATIENT
Start: 2023-12-06 | End: 2023-12-07 | Stop reason: HOSPADM

## 2023-12-06 RX ORDER — ONDANSETRON 4 MG/1
4 TABLET, ORALLY DISINTEGRATING ORAL
Status: DISCONTINUED | OUTPATIENT
Start: 2023-12-06 | End: 2023-12-07 | Stop reason: HOSPADM

## 2023-12-06 RX ORDER — GLYCOPYRROLATE 0.2 MG/ML
INJECTION, SOLUTION INTRAMUSCULAR; INTRAVENOUS PRN
Status: DISCONTINUED | OUTPATIENT
Start: 2023-12-06 | End: 2023-12-06

## 2023-12-06 RX ORDER — SODIUM CHLORIDE, SODIUM LACTATE, POTASSIUM CHLORIDE, CALCIUM CHLORIDE 600; 310; 30; 20 MG/100ML; MG/100ML; MG/100ML; MG/100ML
INJECTION, SOLUTION INTRAVENOUS CONTINUOUS
Status: DISCONTINUED | OUTPATIENT
Start: 2023-12-06 | End: 2023-12-06 | Stop reason: HOSPADM

## 2023-12-06 RX ORDER — LIDOCAINE 40 MG/G
CREAM TOPICAL
Status: DISCONTINUED | OUTPATIENT
Start: 2023-12-06 | End: 2023-12-06 | Stop reason: HOSPADM

## 2023-12-06 RX ORDER — KETAMINE HYDROCHLORIDE 10 MG/ML
INJECTION INTRAMUSCULAR; INTRAVENOUS PRN
Status: DISCONTINUED | OUTPATIENT
Start: 2023-12-06 | End: 2023-12-06

## 2023-12-06 RX ORDER — ONDANSETRON 2 MG/ML
4 INJECTION INTRAMUSCULAR; INTRAVENOUS EVERY 30 MIN PRN
Status: DISCONTINUED | OUTPATIENT
Start: 2023-12-06 | End: 2023-12-07 | Stop reason: HOSPADM

## 2023-12-06 RX ORDER — OXYCODONE HYDROCHLORIDE 5 MG/1
5 TABLET ORAL
Status: DISCONTINUED | OUTPATIENT
Start: 2023-12-06 | End: 2023-12-07 | Stop reason: HOSPADM

## 2023-12-06 RX ORDER — ONDANSETRON 4 MG/1
4 TABLET, ORALLY DISINTEGRATING ORAL EVERY 30 MIN PRN
Status: DISCONTINUED | OUTPATIENT
Start: 2023-12-06 | End: 2023-12-07 | Stop reason: HOSPADM

## 2023-12-06 RX ORDER — SODIUM CHLORIDE, SODIUM LACTATE, POTASSIUM CHLORIDE, CALCIUM CHLORIDE 600; 310; 30; 20 MG/100ML; MG/100ML; MG/100ML; MG/100ML
INJECTION, SOLUTION INTRAVENOUS CONTINUOUS PRN
Status: DISCONTINUED | OUTPATIENT
Start: 2023-12-06 | End: 2023-12-06

## 2023-12-06 RX ORDER — LEVONORGESTREL/ETHIN.ESTRADIOL 0.1-0.02MG
1 TABLET ORAL DAILY
COMMUNITY
Start: 2023-02-06 | End: 2024-05-16

## 2023-12-06 RX ORDER — ACETAMINOPHEN 325 MG/1
975 TABLET ORAL ONCE
Status: DISCONTINUED | OUTPATIENT
Start: 2023-12-06 | End: 2023-12-06 | Stop reason: HOSPADM

## 2023-12-06 RX ORDER — TRAZODONE HYDROCHLORIDE 100 MG/1
50-100 TABLET ORAL
COMMUNITY
Start: 2023-09-12

## 2023-12-06 RX ORDER — LIDOCAINE HYDROCHLORIDE 20 MG/ML
INJECTION, SOLUTION INFILTRATION; PERINEURAL PRN
Status: DISCONTINUED | OUTPATIENT
Start: 2023-12-06 | End: 2023-12-06

## 2023-12-06 RX ADMIN — KETAMINE HYDROCHLORIDE 20 MG: 10 INJECTION INTRAMUSCULAR; INTRAVENOUS at 11:45

## 2023-12-06 RX ADMIN — GLYCOPYRROLATE 0.2 MG: 0.2 INJECTION, SOLUTION INTRAMUSCULAR; INTRAVENOUS at 11:40

## 2023-12-06 RX ADMIN — LIDOCAINE HYDROCHLORIDE 80 MG: 20 INJECTION, SOLUTION INFILTRATION; PERINEURAL at 11:45

## 2023-12-06 RX ADMIN — PROPOFOL 60 MG: 10 INJECTION, EMULSION INTRAVENOUS at 11:45

## 2023-12-06 RX ADMIN — ACETAMINOPHEN 975 MG: 325 TABLET ORAL at 11:24

## 2023-12-06 RX ADMIN — PROPOFOL 100 MCG/KG/MIN: 10 INJECTION, EMULSION INTRAVENOUS at 11:45

## 2023-12-06 RX ADMIN — SODIUM CHLORIDE, SODIUM LACTATE, POTASSIUM CHLORIDE, CALCIUM CHLORIDE: 600; 310; 30; 20 INJECTION, SOLUTION INTRAVENOUS at 11:23

## 2023-12-06 NOTE — ANESTHESIA POSTPROCEDURE EVALUATION
Patient: Elena Cole    Procedure: Procedure(s):  Exam under anesthesia and SETON PLACEMENT       Anesthesia Type:  MAC    Note:  Disposition: Outpatient   Postop Pain Control: Uneventful            Sign Out: Well controlled pain   PONV: No   Neuro/Psych: Uneventful            Sign Out: Acceptable/Baseline neuro status   Airway/Respiratory: Uneventful            Sign Out: Acceptable/Baseline resp. status   CV/Hemodynamics: Uneventful            Sign Out: Acceptable CV status; No obvious hypovolemia; No obvious fluid overload   Other NRE: NONE   DID A NON-ROUTINE EVENT OCCUR?            Last vitals:  Vitals Value Taken Time   /84 12/06/23 1210   Temp 36.5  C (97.7  F) 12/06/23 1210   Pulse 139 12/06/23 1210   Resp 16 12/06/23 1210   SpO2 98 % 12/06/23 1210       Electronically Signed By: Ti Madison MD  December 6, 2023  12:36 PM

## 2023-12-06 NOTE — ANESTHESIA PREPROCEDURE EVALUATION
"Anesthesia Pre-Procedure Evaluation    Patient: Elena Cole   MRN: [id number] : 1995        Procedure : Procedure(s):  FISTULOTOMY, RECTUM, POSSIBLE SETON PLACEMENT          No past medical history on file.   History reviewed. No pertinent surgical history.   No Known Allergies   Social History     Tobacco Use    Smoking status: Not on file    Smokeless tobacco: Not on file   Substance Use Topics    Alcohol use: Not on file      Wt Readings from Last 1 Encounters:   23 61.2 kg (135 lb)        Anesthesia Evaluation            ROS/MED HX  ENT/Pulmonary:  - neg pulmonary ROS     Neurologic:  - neg neurologic ROS     Cardiovascular:  - neg cardiovascular ROS     METS/Exercise Tolerance: >4 METS    Hematologic:  - neg hematologic  ROS     Musculoskeletal:  - neg musculoskeletal ROS     GI/Hepatic:  - neg GI/hepatic ROS     Renal/Genitourinary:  - neg Renal ROS     Endo:  - neg endo ROS     Psychiatric/Substance Use:  - neg psychiatric ROS     Infectious Disease:  - neg infectious disease ROS     Malignancy:  - neg malignancy ROS     Other:  - neg other ROS          Physical Exam    Airway        Mallampati: II   TM distance: > 3 FB   Neck ROM: full     Respiratory Devices and Support         Dental       (+) Completely normal teeth      Cardiovascular          Rhythm and rate: regular     Pulmonary           breath sounds clear to auscultation           OUTSIDE LABS:  CBC:   Lab Results   Component Value Date    WBC 14.8 (H) 10/03/2023    HGB 13.4 10/03/2023    HCT 40.0 10/03/2023     10/03/2023     BMP:   Lab Results   Component Value Date     (L) 10/03/2023    POTASSIUM 3.5 10/03/2023    CHLORIDE 102 10/03/2023    CO2 20 (L) 10/03/2023    BUN 11.2 10/03/2023    CR 0.73 10/03/2023    GLC 87 10/03/2023     COAGS: No results found for: \"PTT\", \"INR\", \"FIBR\"  POC: No results found for: \"BGM\", \"HCG\", \"HCGS\"  HEPATIC: No results found for: \"ALBUMIN\", \"PROTTOTAL\", \"ALT\", \"AST\", \"GGT\", \"ALKPHOS\", " "\"BILITOTAL\", \"BILIDIRECT\", \"KINZA\"  OTHER:   Lab Results   Component Value Date    CLAUDIA 9.4 10/03/2023       Anesthesia Plan    ASA Status:  1       Anesthesia Type: MAC.     - Reason for MAC: immobility needed              Consents    Anesthesia Plan(s) and associated risks, benefits, and realistic alternatives discussed. Questions answered and patient/representative(s) expressed understanding.     - Discussed: Risks, Benefits and Alternatives for BOTH SEDATION and the PROCEDURE were discussed     - Discussed with:  Patient            Postoperative Care    Pain management: Multi-modal analgesia.   PONV prophylaxis: Ondansetron (or other 5HT-3), Background Propofol Infusion     Comments:               Ti Madison MD    I have reviewed the pertinent notes and labs in the chart from the past 30 days and (re)examined the patient.  Any updates or changes from those notes are reflected in this note.                  "

## 2023-12-06 NOTE — ANESTHESIA CARE TRANSFER NOTE
Patient: Elena Cole    Procedure: Procedure(s):  Exam under anesthesia and SETON PLACEMENT       Diagnosis: Anal fistula [K60.3]  Diagnosis Additional Information: No value filed.    Anesthesia Type:   MAC     Note:    Oropharynx: oropharynx clear of all foreign objects and spontaneously breathing  Level of Consciousness: awake  Oxygen Supplementation: room air    Independent Airway: airway patency satisfactory and stable  Dentition: dentition unchanged  Vital Signs Stable: post-procedure vital signs reviewed and stable  Report to RN Given: handoff report given  Patient transferred to: Phase II    Handoff Report: Identifed the Patient, Identified the Reponsible Provider, Reviewed the pertinent medical history, Discussed the surgical course, Reviewed Intra-OP anesthesia mangement and issues during anesthesia, Set expectations for post-procedure period and Allowed opportunity for questions and acknowledgement of understanding  Vitals:  Vitals Value Taken Time   BP     Temp     Pulse     Resp     SpO2         Electronically Signed By: SULEIMAN Jorgensen [facility]  December 6, 2023  12:13 PM

## 2023-12-06 NOTE — DISCHARGE INSTRUCTIONS
[facility]  Home Care Following Anesthesia  For 24 hours after surgery:  Get plenty of rest.  A responsible adult must stay with you for at least 24 hours after you leave the surgery center.  Do not drive or use heavy equipment.  If you have weakness or tingling, don't drive or use heavy equipment until this feeling goes away.   Do not drink alcohol.   Avoid strenuous or risky activities.  Ask for help when climbing stairs.  You may feel lightheaded.  IF so, sit for a few minutes before standing.  Have someone help you get up.   If you have nausea (feel sick to your stomach): Drink only clear liquids such as apple juice, ginger ale, broth or 7-Up.  Rest may also help.  Be sure to drink enough fluids.  Move to a regular diet as you feel able.   You may have a slight fever.  Call the doctor if your fever is over 100 F (37.7 C) (taken under the tongue) or lasts longer than 24 hours.  Do not make important or legal decisions.   It is recommended to avoid smoking.              Tylenol/Acetaminophen Consumption    If you feel your pain relief is insufficient, you may take Tylenol/Acetaminophen in addition to your narcotic pain medication.   Be careful not to exceed 4,000 mg of Tylenol/Acetaminophen in a 24 hour period from all sources.  If you are taking extra strength Tylenol/acetaminophen (500 mg), the maximum dose is 8 tablets in 24 hours.  If you are taking regular strength acetaminophen (325 mg), the maximum dose is 12 tablets in 24 hours.    Call a doctor for any of the following:  Signs of infection (fever, growing tenderness at the surgery site, a large amount of drainage or bleeding, severe pain, foul-smelling drainage, redness, swelling).  It has been over 8 to 10 hours since surgery and you are still not able to urinate (pass water).  Headache for over 24 hours.  Signs of Covid-19 infection (temperature over 100 degrees, shortness of breath, cough, loss of taste/smell,  generalized body aches, persistent headache, chills, sore throat, nausea/vomiting/diarrhea)    Your doctor is:  Dr. James Mcdonough, Colon Rectal: [phone]  (Monday-Friday 8 am to 4 pm)              Or for After Hours concerns: dial [phone] and ask for the resident on call for:  Colon Rectal  For emergency care, call the:  [facility]:  [phone] (TTY for hearing impaired: [phone])

## 2023-12-06 NOTE — OP NOTE
"Colorectal surgery operative note    PREOPERATIVE DIAGNOSIS:  1. History of anorectal infection.  2. Suspicion for anorectal fistula.      POSTOPERATIVE DIAGNOSIS:   1. History of anorectal infection.  2. Transsphincteric anorectal fistula    PROCEDURE:  1. Evaluation under anesthesia.  2. Placement of non-cutting seton.     ANESTHESIA: MAC plus local anesthesia.    SURGEON: James Mcdonough M.D.    ASSISTANT(S): ronal Selby.    A midlevel provider was required for this operation for additional assistance with retraction, hemostasis and exposure due to the lack of qualified assitance      INDICATIONS FOR PROCEDURE  Elena Cole is a 28 year old female who presented with clinical suspicion for an anorectal fistula. I thoroughly discussed the risks, benefits, and alternatives of operative treatment with the patient and he/she agreed to proceed.    General risks related to anorectal surgery were reviewed with the patient. These include, but are not limited to urinary retention, abscess, infection, bleeding, chronic pain, anal stenosis, fistula, fissure, and fecal incontinence.     OPERATIVE PROCEDURE: After obtaining informed consent, the patient was brought to the operating room and placed in the prone jackknife position. MAC anesthesia was gently induced. Bilateral lower extremity pneumatic compression devices were applied and all pressure points were cushioned. The perianal area was then preped and draped in the standard sterile fashion. After a \"time-out\" was performed, a total of 30 mL of Bupivacaine 0.25% and Lidocaine 1% with epinephrine was injected as a pudendal block. Digital rectal exam and anoscopy were performed. Findings: Left anterior external opening. This was easily cannulated to an internal opening in the midline. There was a significant portion of the sphincter muscle complex being involved. A yellow vessel loop non-cutting seton was gently placed and secured with 3 individual 0 silk " sutures. The distal rectum and anal canal were irrigated. Hemostasis was achieved. Instrument, sponge, and needle counts were all correct as reported to me. Bacitracin was applied, as well as a sterile dressing. The patient tolerated the procedure well.    COMPLICATIONS: none, immediately.    ESTIMATED BLOOD LOSS: 2 mL.    SPECIMEN(S): None.    DRAINS/TUBES: yellow vessel loop non-cutting seton.    OPERATIVE FINDINGS: Transsphincteric fistula.    DISPOSITION: PACU. MRI in 2 months. Possible LIFT.    James Mcdonough MD    Division of Colon & Rectal Surgery  Department of Surgery  [facility]  [phone]

## 2023-12-21 ENCOUNTER — OFFICE VISIT (OUTPATIENT)
Dept: SURGERY | Facility: CLINIC | Age: 28
End: 2023-12-21
Payer: COMMERCIAL

## 2023-12-21 VITALS
BODY MASS INDEX: 22.69 KG/M2 | SYSTOLIC BLOOD PRESSURE: 110 MMHG | OXYGEN SATURATION: 98 % | HEART RATE: 68 BPM | DIASTOLIC BLOOD PRESSURE: 70 MMHG | HEIGHT: 65 IN | WEIGHT: 136.2 LBS

## 2023-12-21 DIAGNOSIS — K60.30 ANAL FISTULA: Primary | ICD-10-CM

## 2023-12-21 DIAGNOSIS — Z09 FOLLOW-UP EXAMINATION AFTER COLORECTAL SURGERY: Primary | ICD-10-CM

## 2023-12-21 DIAGNOSIS — K60.30 ANAL FISTULA: ICD-10-CM

## 2023-12-21 PROCEDURE — 99024 POSTOP FOLLOW-UP VISIT: CPT | Performed by: NURSE PRACTITIONER

## 2023-12-21 ASSESSMENT — PAIN SCALES - GENERAL: PAINLEVEL: MILD PAIN (2)

## 2023-12-21 NOTE — NURSING NOTE
"Chief Complaint   Patient presents with    Post-op Visit       Vitals:    12/21/23 1017   BP: 110/70   BP Location: Left arm   Patient Position: Sitting   Cuff Size: Adult Regular   Pulse: 68   SpO2: 98%   Weight: 136 lb 3.2 oz   Height: 5' 5\"       Body mass index is 22.66 kg/m .    Shelley Nguyen CMA    "

## 2023-12-21 NOTE — PROGRESS NOTES
"Colon and Rectal Surgery Postoperative Clinic Note    RE: Elena Cole  : 1995  JOSIAH: 2023    Elena Cole is a very pleasant 28 year old female with transsphincteric anal fistula now status post placement of seton on 23.     Interval history: She is doing well. Seton is irritating but no significant pain. Some continued drainage and she is keeping gauze tucked at the site and doing tub baths. No fevers or chills. No difficulty with bowel movements.    Physical Examination: Exam was chaperoned by Shelley Nguyen MA   /70 (BP Location: Left arm, Patient Position: Sitting, Cuff Size: Adult Regular)   Pulse 68   Ht 5' 5\"   Wt 136 lb 3.2 oz   SpO2 98%   BMI 22.66 kg/m    General: alert, oriented, in no acute distress, sitting comfortably  HEENT: mucous membranes moist    Perianal external examination:  Perianal skin: yellow vessel loop seton in place in the left anterior position with ties external to the tract. No erythema and no purulent drainage.    Digital rectal examination: Was deferred.    Anoscopy: Was deferred.    Assessment/Plan:  28 year old female with transsphincteric anal fistula now status post placement of seton on 23. She is doing well. No difficulty with bowel movements and minimal drainage. Per Dr. Mcdonough, will get a 3T MRI and then have her meet with him to discuss possible ligation of intersphincteric fistula tract (LIFT). Encouraged her to contact the clinic in the meantime with any questions or concerns. Patient's questions were answered to her stated satisfaction and she is in agreement with this plan.     Medical history:  No past medical history on file.    Surgical history:  Past Surgical History:   Procedure Laterality Date    FISTULOTOMY RECTUM N/A 2023    Procedure: Exam under anesthesia and SETON PLACEMENT;  Surgeon: James Mcdonough MD;  Location: [facility] OR       Problem list:    Patient Active Problem List    Diagnosis Date Noted    Anal " "fistula 10/23/2023     Priority: Medium       Medications:  Current Outpatient Medications   Medication Sig Dispense Refill    levonorgestrel-ethinyl estradiol (AVIANE) 0.1-20 MG-MCG tablet Take 1 tablet by mouth daily      traZODone (DESYREL) 100 MG tablet Take  mg by mouth      oxyCODONE (ROXICODONE) 5 MG tablet Take 1 tablet (5 mg) by mouth every 6 hours as needed for pain (Patient not taking: Reported on 12/21/2023) 5 tablet 0       Allergies:  No Known Allergies    Family history:  No family history on file.    Social history:  Social History     Tobacco Use    Smoking status: Never    Smokeless tobacco: Never   Substance Use Topics    Alcohol use: Not on file     Marital status: .  Nursing Notes:   Shelley Nguyen  12/21/2023 10:20 AM  Signed  Chief Complaint   Patient presents with    Post-op Visit       Vitals:    12/21/23 1017   BP: 110/70   BP Location: Left arm   Patient Position: Sitting   Cuff Size: Adult Regular   Pulse: 68   SpO2: 98%   Weight: 136 lb 3.2 oz   Height: 5' 5\"       Body mass index is 22.66 kg/m .    Shelley Nguyen CMA       15 minutes spent on the date of the encounter doing chart review, history and exam, documentation and further activities as noted above.   This is a postop visit.    SULEIMAN Jose, NP-C  Colon and Rectal Surgery  [facility]    This note was created using speech recognition software and may contain unintended word substitutions.    "

## 2023-12-21 NOTE — LETTER
"2023       RE: Elena Cole   [place] [address]     Dear Colleague,    Thank you for referring your patient, Elena Cole, to the [facility] COLON AND RECTAL SURGERY CLINIC [place] [facility]. Please see a copy of my visit note below.    Colon and Rectal Surgery Postoperative Clinic Note    RE: Elena Cole  : 1995  JOSIAH: 2023    Elena Cole is a very pleasant 28 year old female with transsphincteric anal fistula now status post placement of seton on 23.     Interval history: She is doing well. Seton is irritating but no significant pain. Some continued drainage and she is keeping gauze tucked at the site and doing tub baths. No fevers or chills. No difficulty with bowel movements.    Physical Examination: Exam was chaperoned by Shelley Nguyen MA   /70 (BP Location: Left arm, Patient Position: Sitting, Cuff Size: Adult Regular)   Pulse 68   Ht 5' 5\"   Wt 136 lb 3.2 oz   SpO2 98%   BMI 22.66 kg/m    General: alert, oriented, in no acute distress, sitting comfortably  HEENT: mucous membranes moist    Perianal external examination:  Perianal skin: yellow vessel loop seton in place in the left anterior position with ties external to the tract. No erythema and no purulent drainage.    Digital rectal examination: Was deferred.    Anoscopy: Was deferred.    Assessment/Plan:  28 year old female with transsphincteric anal fistula now status post placement of seton on 23. She is doing well. No difficulty with bowel movements and minimal drainage. Per Dr. Mcdonough, will get a 3T MRI and then have her meet with him to discuss possible ligation of intersphincteric fistula tract (LIFT). Encouraged her to contact the clinic in the meantime with any questions or concerns. Patient's questions were answered to her stated satisfaction and she is in agreement with this plan.     Medical history:  No " "past medical history on file.    Surgical history:  Past Surgical History:   Procedure Laterality Date    FISTULOTOMY RECTUM N/A 12/6/2023    Procedure: Exam under anesthesia and SETON PLACEMENT;  Surgeon: James Mcdonough MD;  Location: [facility] [place] OR       Problem list:    Patient Active Problem List    Diagnosis Date Noted    Anal fistula 10/23/2023     Priority: Medium       Medications:  Current Outpatient Medications   Medication Sig Dispense Refill    levonorgestrel-ethinyl estradiol (AVIANE) 0.1-20 MG-MCG tablet Take 1 tablet by mouth daily      traZODone (DESYREL) 100 MG tablet Take  mg by mouth      oxyCODONE (ROXICODONE) 5 MG tablet Take 1 tablet (5 mg) by mouth every 6 hours as needed for pain (Patient not taking: Reported on 12/21/2023) 5 tablet 0       Allergies:  No Known Allergies    Family history:  No family history on file.    Social history:  Social History     Tobacco Use    Smoking status: Never    Smokeless tobacco: Never   Substance Use Topics    Alcohol use: Not on file     Marital status: .  Nursing Notes:   Shelley Nguyen  12/21/2023 10:20 AM  Signed  Chief Complaint   Patient presents with    Post-op Visit       Vitals:    12/21/23 1017   BP: 110/70   BP Location: Left arm   Patient Position: Sitting   Cuff Size: Adult Regular   Pulse: 68   SpO2: 98%   Weight: 136 lb 3.2 oz   Height: 5' 5\"       Body mass index is 22.66 kg/m .    Shelley Nguyen CMA       15 minutes spent on the date of the encounter doing chart review, history and exam, documentation and further activities as noted above.   This is a postop visit.      This note was created using speech recognition software and may contain unintended word substitutions.        Again, thank you for allowing me to participate in the care of your patient.      Sincerely,    SULEIMAN Daniels CNP    "

## 2024-01-18 DIAGNOSIS — K60.30 ANAL FISTULA: Primary | ICD-10-CM

## 2024-01-18 DIAGNOSIS — Z09 FOLLOW-UP EXAMINATION AFTER COLORECTAL SURGERY: ICD-10-CM

## 2024-01-22 ENCOUNTER — HOSPITAL ENCOUNTER (OUTPATIENT)
Facility: AMBULATORY SURGERY CENTER | Age: 29
Discharge: HOME | End: 2024-01-22
Attending: SURGERY | Admitting: SURGERY
Payer: COMMERCIAL

## 2024-01-22 ENCOUNTER — TELEPHONE (OUTPATIENT)
Dept: SURGERY | Facility: CLINIC | Age: 29
End: 2024-01-22
Payer: COMMERCIAL

## 2024-01-22 PROBLEM — K60.30 ANAL FISTULA: Status: ACTIVE | Noted: 2023-10-23

## 2024-01-22 NOTE — TELEPHONE ENCOUNTER
On 1/22/2024 at 12:20 PM:    Patient is scheduled for surgery with Dr. James Mcdonough     Spoke with: Elena    Date of Surgery: 2/19/2024    [facility] OR    Informed patient they will need an adult  YES    Upcoming Related Appointments:     Feb 02, 2024  4:00 PM  (Arrive by 3:30 PM)  MR PELVIS (INTRAPELVIC ORGANS) W/O & W CONTRAST with [id number]  [facility]  [phone]    [address] [place] [address]      Pre-operative Physical:    - clinic appointment to be scheduled by you at [facility] in [facility]  - required appointment, to be completed within 30 days before your surgery date    Feb 13, 2024  3:30 PM  (Arrive by 3:15 PM)  Return Patient with James Mcdonough MD  [facility] Colon and Rectal Surgery [facility] [phone]    [address] [place] [address]      Mar 04, 2024  1:30 PM  (Arrive by 1:15 PM)  Post Op with Lela Godoy PA-C  [facility] Colon [facility] Rectal [facility] [phone]    [address] [place] [address]      Surgery packet: sent via [email or web address] and Mail     Heavenly Dozier  Amanda-op Coordinator  [place]  Direct Phone: [phone]

## 2024-01-22 NOTE — LETTER
"Elena Dias Douglas   [facility]  [place] [address]    SURGERY PACKET     Required: Yes, you will need a  18 years or older to drive you home from your procedure as well as stay with you for 24 hours after your procedure    Surgery: LIGATION, FISTULA TRACT, INTERSPHINCTERIC     Date: 2024       Surgeon: Dr. James Mcdonough    Time: You will receive a call 1-3 days prior to your surgery with your finalized surgery and arrival time.     Location:     [facility]      [facility] [address]C)      [place] [address]      [phone]      [email or web address]     [facility] - Other Important Phone Numbers:     Pre-Admissions Phone: [phone]  Pre-Admissions Fax: [phone]    [phone]  Nurse Line (PREETHI Gomez): [phone]     Services: [phone]  Scheduling (Heavenly): [phone]     Upcoming Related Appointments:     2024  4:00 PM  (Arrive by 3:30 PM)  MR PELVIS (INTRAPELVIC ORGANS) W/O & W CONTRAST with [id number]  [facility]  [phone]    [address] [address]      Pre-operative Physical:    - clinic appointment to be scheduled by you at [facility] in [facility]  - required appointment, to be completed within 30 days before your surgery date        2024  3:30 PM  (Arrive by 3:15 PM)  Return Patient with James Mcdonough MD  [facility] Colon and Rectal Surgery Clinic [facility] [phone]    [address] [address]      Mar 04, 2024  1:30 PM  (Arrive by 1:15 PM)  Post Op with Lela Godoy PA-C  [facility] Colon and Rectal Surgery Clinic [facility] [phone]    [address] [place] [address]      Preparation: 2 Fleet Enemas (See \"Day of " "Surgery\")    WITHIN 30 DAYS OF SURGERY    Have a pre-op physical exam.  This may be scheduled with your primary care physician, or scheduled with the Preoperative Assessment Center (PAC). All surgeries require a pre-op physical within 30 days.  If you do not have a pre-op physical completed in within this time, your surgery will be rescheduled. No exceptions.    Tell the doctor if:    -You are allergic to latex or rubber.  (Latex rubber gloves are often used in medical care.)    -You are taking any NSAID products (including baby aspirin, Ecotrin, Advil, Motrin, Ibuprofen, Clinoril, Feldane, or Naprosyn), or vitamins and/or herbal products.  You may need to stop taking some medicines before surgery.    -If you are taking a blood thinner medication such as Coumadin, Plavix, or Warfarin, you MUST contact the prescribing physician regarding clearance for this procedure, or instructions for stopping/changing this medication before surgery.    -You have any medical problems (allergies, diabetes, heart disease, etc.)    -You have a pacemaker or an AICD (automatic implanted cardiac defibrillator).  If you do, please bring the ID card with you on the day of surgery.    -You are a smoker.  People who smoke have a higher risk of infection after surgery.  Ask your doctor how you can quit smoking.     10 DAYS PRIOR TO SURGERY    -Arrange someone to pick you up after surgery.  You may not drive yourself home after surgery.  A  is required for all surgeries.  If you do not have a  arranged prior to surgery, your surgery will be cancelled.     -Purchase 2 Fleet enemas over the counter at any local pharmacy or store.  There is no prescription required.        THE DAY BEFORE SURGERY    -Call your surgeon if there is any change in your health.  This includes signs of a cold, flu, sore throat, runny nose, cough, rash, or fever.    -Do not smoke, drink alcohol, or take over-the-counter medicine (unless approved by your " surgeon) for 24 hours before and after surgery.    You may eat and drink normally until midnight.  Do not eat solid foods after midnight.  You will need to do a bowel prep the day of your procedure.  Showering Before Surgery  Your surgeon has asked you to take 2 showers before surgery.  Why is this important?  It is normal for bacteria (germs) to be on your skin. The skin protects us from these germs. When you have surgery, we cut the skin. Sometimes germs get into the cuts and cause infection (illness caused by germs). By following the instructions below and using special soap, you will lower the number of germs on your skin. This decreases your chance of infection.  Special soap  Buy or get 8 ounces of antiseptic surgical soap called 4% CHG. Common name brands of this soap are Hibiclens and Exidine.  You can find it at your local pharmacy, clinic or retail store. If you have trouble, ask your pharmacist to help you find the right substitute.  A note about shaving:  Do not shave within 12 inches of your incision (surgical cut) area for at least 3 days before surgery. Shaving can make small cuts in the skin. This puts you at a higher risk of infection.  Items you will need for each shower:  1 newly washed towel  4 ounces of one of the above soaps  Clean pajamas or clothes to change into  Follow these instructions:  Follow these steps the evening before surgery and the morning of surgery.  Wash your hair and body with your regular shampoo and soap. Make sure you rinse the shampoo and soap from your hair and body.    Using clean hands, apply about 2 ounces of soap gently on your skin from your ear lobes to your toes. Use on your groin area last. Do not use this soap on your face or head. If you get any soap in your eyes, ears or mouth, rinse right away.  Repeat step 2. It is very important to let the soap stay on your skin for at least 1 minute.  Rinse well and dry off using a clean towel.  If you feel any tingling,  itching or other irritation, rinse right away. It is normal to feel some coolness on the skin after using the antiseptic soap. Your skin may feel a bit dry after the shower, but do not use any lotions, creams or moisturizers. Do not use hair spray or other products in your hair.  Dress in freshly washed clothes or pajamas. Use fresh pillowcases and sheets on your bed.  Repeat these steps the morning of surgery.  If you have any questions about showering or an allergy to CHG soap, please call your surgery center.  For informational purposes only. Not to replace the advice of your health care provider. Copyright   2012 [facility]. All rights reserved. Clinically reviewed by Infection Prevention and Practice and Education. [employer] 480915 - REV 12/20.    THE DAY OF SURGERY    Bowel Preparation:  FLEET ENEMA INSTRUCTIONS    2 hours prior to your arrival time for procedure:    BOWEL PREP: FLEET ENEMA INSTRUCTIONS     Purchase 2 Fleet enemas over the counter at any local pharmacy or store.  There is no prescription required.  Start your prep 2 hours prior to your appointment check-in time.     2 Hours Prior to arrival for Your Procedure or Surgery     1. Remove orange protective shield from enema Comfortip before inserting.     2. With steady pressure, gently insert enema tip into rectum with a slight side-to-side movement, with tip pointing toward the navel.  Insertion may be easier if you bear down, as if you are having a bowel movement.     3. Do not force the enema tip into rectum, as this can cause injury.     4. squeeze bottle until nearly all liquid is gone.  It is not neccessary to empty the bottle      5. Remove Comforttip from rectum, and maintain position until you feel the urge to evacuate is strong (usually 2-5 minutes).       Repeat the same steps for the second enema 30 minutes after completing the first enema.      For any questions or concerns, please contact our care coordinators- Patricia  RN: [phone] or Herlinda RN: [phone]    -You may have clear liquids up to 2 hours prior to surgery.      -Do not wear deodorant, cologne, scented lotion, makeup, nail polish, or jewelry.     -Wear loose clothing.    Bring these items to the hospital:  - Insurance Cards  - Money for parking and co-pays if needed  - A list of all medications you take including over-the-counter medication  - A copy of your advance health care directive (if applicable)  - Inhaler or CPAP machine (if used at home)    For any medical questions or concerns regarding medications, bowel prep, or FMLA paperwork, please contact:    Patricia RN: [phone]    For any scheduling related questions, please contact:    Surgery Scheduling Line (Heavenly): [phone]     Thank you!    If you or your  are deaf or hard of hearing, or prefer a language other than English, please let us know.  We have many free services, including interpreters and other aids to help you communicate. You may ask for help  through any member of your care team or by calling Language Services at [phone], option 2.

## 2024-01-24 ENCOUNTER — PREP FOR PROCEDURE (OUTPATIENT)
Dept: SURGERY | Facility: CLINIC | Age: 29
End: 2024-01-24
Payer: COMMERCIAL

## 2024-01-30 ENCOUNTER — OFFICE VISIT (OUTPATIENT)
Dept: INTERNAL MEDICINE | Facility: CLINIC | Age: 29
End: 2024-01-30
Payer: COMMERCIAL

## 2024-01-30 VITALS
BODY MASS INDEX: 22.82 KG/M2 | OXYGEN SATURATION: 98 % | RESPIRATION RATE: 16 BRPM | WEIGHT: 137 LBS | SYSTOLIC BLOOD PRESSURE: 112 MMHG | HEART RATE: 100 BPM | TEMPERATURE: 98.3 F | HEIGHT: 65 IN | DIASTOLIC BLOOD PRESSURE: 64 MMHG

## 2024-01-30 DIAGNOSIS — Z01.818 PREOP GENERAL PHYSICAL EXAM: Primary | ICD-10-CM

## 2024-01-30 DIAGNOSIS — K60.30 ANAL FISTULA: ICD-10-CM

## 2024-01-30 PROCEDURE — 99203 OFFICE O/P NEW LOW 30 MIN: CPT | Performed by: NURSE PRACTITIONER

## 2024-01-30 NOTE — PROGRESS NOTES
Preoperative Evaluation  [facility]  [address]  [address]  [facility] [address]  Phone: [phone]  Primary Provider: Fabio Davey  Pre-op Performing Provider: MEGHAN MALDONADO  Jan 30, 2024       Elena is a 29 year old, presenting for the following:  preop      1/30/2024     2:16 PM   Additional Questions   Roomed by Haily ELIZABETH     Surgical Information  Surgery/Procedure: LIGATION, FISTULA TRACT, INTERSPHINCTERIC   [facility] [place] [facility]  Surgeon: Nirmal Mcdonough  Surgery Date: 2-19-24  Time of Surgery: 7:15 am  Where patient plans to recover: At home with family  Fax number for surgical facility: Note does not need to be faxed, will be available electronically in Epic.    Assessment & Plan     The proposed surgical procedure is considered INTERMEDIATE risk.    Preop general physical exam      Anal fistula  Needs repair             - No identified additional risk factors other than previously addressed    Antiplatelet or Anticoagulation Medication Instructions   - Patient is on no antiplatelet or anticoagulation medications.    Additional Medication Instructions  Patient is on no additional chronic medications    Recommendation  APPROVAL GIVEN to proceed with proposed procedure, without further diagnostic evaluation.      15 minutes spent by me on the date of the encounter doing chart review, history and exam, documentation and further activities per the note    Subjective       Via the Health Maintenance questionnaire, the patient has reported the following services have been completed -Cervical Cancer Screening, this information has been sent to the abstraction team.  HPI related to upcoming procedure: anal fistula    Off birth control for several month as not sexually active        1/24/2024     7:33 PM   Preop Questions   1. Have you ever had a heart attack or stroke? No   2. Have you ever had surgery on your heart or blood vessels, such as a stent  placement, a coronary artery bypass, or surgery on an artery in your head, neck, heart, or legs? No   3. Do you have chest pain with activity? No   4. Do you have a history of  heart failure? No   5. Do you currently have a cold, bronchitis or symptoms of other infection? No   6. Do you have a cough, shortness of breath, or wheezing? No   7. Do you or anyone in your family have previous history of blood clots? No   8. Do you or does anyone in your family have a serious bleeding problem such as prolonged bleeding following surgeries or cuts? No   9. Have you ever had problems with anemia or been told to take iron pills? No   10. Have you had any abnormal blood loss such as black, tarry or bloody stools, or abnormal vaginal bleeding? No   11. Have you ever had a blood transfusion? No   12. Are you willing to have a blood transfusion if it is medically needed before, during, or after your surgery? Yes   13. Have you or any of your relatives ever had problems with anesthesia? No   14. Do you have sleep apnea, excessive snoring or daytime drowsiness? No   15. Do you have any artifical heart valves or other implanted medical devices like a pacemaker, defibrillator, or continuous glucose monitor? No   16. Do you have artificial joints? No   17. Are you allergic to latex? No   18. Is there any chance that you may be pregnant? No       Health Care Directive  Patient does not have a Health Care Directive or Living Will: Discussed advance care planning with patient; information given to patient to review.    Preoperative Review of    reviewed - no record of controlled substances prescribed.          Patient Active Problem List    Diagnosis Date Noted    Anal fistula 10/23/2023     Priority: Medium      No past medical history on file.  Past Surgical History:   Procedure Laterality Date    FISTULOTOMY RECTUM N/A 12/6/2023    Procedure: Exam under anesthesia and SETON PLACEMENT;  Surgeon: James Mcdonough MD;  Location:  "[facility] OR     Current Outpatient Medications   Medication Sig Dispense Refill    levonorgestrel-ethinyl estradiol (AVIANE) 0.1-20 MG-MCG tablet Take 1 tablet by mouth daily      oxyCODONE (ROXICODONE) 5 MG tablet Take 1 tablet (5 mg) by mouth every 6 hours as needed for pain (Patient not taking: Reported on 12/21/2023) 5 tablet 0    traZODone (DESYREL) 100 MG tablet Take  mg by mouth         No Known Allergies     Social History     Tobacco Use    Smoking status: Never    Smokeless tobacco: Never   Substance Use Topics    Alcohol use: Not on file     Family History   Problem Relation Age of Onset    Breast Cancer Other         Maternal Aunt    Breast Cancer Other         Maternal Aunt    Colon Cancer Other         Maternal Aunt    Asthma Brother      History   Drug Use Not on file         Review of Systems    Review of Systems  Constitutional, neuro, ENT, endocrine, pulmonary, cardiac, gastrointestinal, genitourinary, musculoskeletal, integument and psychiatric systems are negative, except as otherwise noted.  Objective    There were no vitals taken for this visit.   Estimated body mass index is 22.66 kg/m  as calculated from the following:    Height as of 12/21/23: 1.651 m (5' 5\").    Weight as of 12/21/23: 61.8 kg (136 lb 3.2 oz).  Physical Exam  GENERAL: alert and no distress  EYES: Eyes grossly normal to inspection, PERRL and conjunctivae and sclerae normal  HENT: ear canals and TM's normal, nose and mouth without ulcers or lesions  NECK: no adenopathy, no asymmetry, masses, or scars  RESP: lungs clear to auscultation - no rales, rhonchi or wheezes  CV: regular rate and rhythm, normal S1 S2, no S3 or S4, no murmur, click or rub, no peripheral edema  ABDOMEN: soft, nontender, no hepatosplenomegaly, no masses and bowel sounds normal  MS: no gross musculoskeletal defects noted, no edema  SKIN: no suspicious lesions or rashes  NEURO: Normal strength and tone, mentation intact and speech normal  PSYCH: mentation " appears normal, affect normal/bright    Recent Labs   Lab Test 10/03/23  0036   HGB 13.4      *   POTASSIUM 3.5   CR 0.73        Diagnostics  No labs were ordered during this visit.   No EKG required for low risk surgery (cataract, skin procedure, breast biopsy, etc).    Revised Cardiac Risk Index (RCRI)  The patient has the following serious cardiovascular risks for perioperative complications:   - No serious cardiac risks = 0 points     RCRI Interpretation: 0 points: Class I (very low risk - 0.4% complication rate)         Signed Electronically by: SULEIMAN Martino CNP  Copy of this evaluation report is provided to requesting physician.

## 2024-02-02 ENCOUNTER — ANCILLARY PROCEDURE (OUTPATIENT)
Dept: MRI IMAGING | Facility: CLINIC | Age: 29
End: 2024-02-02
Attending: SURGERY
Payer: COMMERCIAL

## 2024-02-02 DIAGNOSIS — K60.30 ANAL FISTULA: ICD-10-CM

## 2024-02-02 PROCEDURE — A9585 GADOBUTROL INJECTION: HCPCS | Performed by: RADIOLOGY

## 2024-02-02 PROCEDURE — 72197 MRI PELVIS W/O & W/DYE: CPT | Performed by: RADIOLOGY

## 2024-02-02 RX ORDER — GADOBUTROL 604.72 MG/ML
7.5 INJECTION INTRAVENOUS ONCE
Status: COMPLETED | OUTPATIENT
Start: 2024-02-02 | End: 2024-02-02

## 2024-02-02 RX ADMIN — GADOBUTROL 6.5 ML: 604.72 INJECTION INTRAVENOUS at 16:19

## 2024-02-02 NOTE — DISCHARGE INSTRUCTIONS
MRI Contrast Discharge Instructions    The IV contrast you received today will pass out of your body in your  urine. This will happen in the next 24 hours. You will not feel this process.  Your urine will not change color.    Drink at least 4 extra glasses of water or juice today (unless your doctor  has restricted your fluids). This reduces the stress on your kidneys.  You may take your regular medicines.    If you are on dialysis: It is best to have dialysis today.    If you have a reaction: Most reactions happen right away. If you have  any new symptoms after leaving the hospital (such as hives or swelling),  call your hospital at the correct number below. Or call your family doctor.  If you have breathing distress or wheezing, call 911.    Special instructions: ***    I have read and understand the above information.    Signature:______________________________________ Date:___________    Staff:__________________________________________ Date:___________     Time:__________    [facility] Departments:    ___[place]: [phone]  ___[facility]: [phone]  ___[place]: [phone] ___[facility]: [phone]  ___[facility]: [phone]  ___[facility]: [phone]  ___[phone]  ___[facility]: [phone]  ___[phone]

## 2024-02-06 NOTE — PROGRESS NOTES
"Colon and Rectal Surgery Clinic Note    RE: Elena Merrill.  : 1995.  JOSIAH: 2024.    Reason for visit: anal fistula.    HPI: Elena Merrill is a 29 year old female who presents today for an anal fistula. She has a past medical history of asthma. I took her to the OR on 2024 for a seton placement. MRI pelvis on 2024 demonstrated a transsphincteric left perianal fistula.     Today Elena feels fine. She has a lot of drainage around the seton.     MRI Pelvis (2024):  Impression:      1. 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      2. No discrete rim-enhancing collection/abscess identified.     3. Mildly thickened appearance of uterine junction zone on limited  assessment, non-specific, can be seen in the setting of adenomyosis.     Medical history:  Asthma    Surgical history:  Seton placement     Family history:  Family History   Problem Relation Age of Onset    Breast Cancer Other         Maternal Aunt    Breast Cancer Other         Maternal Aunt    Colon Cancer Other         Maternal Aunt    Asthma Brother        Medications:  Current Outpatient Medications   Medication Sig Dispense Refill    metroNIDAZOLE (FLAGYL) 500 MG tablet Take 1 tablet (500 mg) by mouth 3 times daily for 7 days 21 tablet 0    levonorgestrel-ethinyl estradiol (AVIANE) 0.1-20 MG-MCG tablet Take 1 tablet by mouth daily      traZODone (DESYREL) 100 MG tablet Take  mg by mouth         Allergies:  No Known Allergies    Social history:   Social History     Tobacco Use    Smoking status: Never    Smokeless tobacco: Never   Substance Use Topics    Alcohol use: Yes     Marital status: .    ROS:  A complete review of systems was performed with the patient and all systems negative except as per HPI.    Physical Examination:  Exam was chaperoned by Michael Bonner, EMT-P  /80   Pulse 70   Temp 98.6  F (37  C)   Ht 1.651 m (5' 5\")   Wt 61.2 kg (135 lb)   " LMP 01/20/2024 (Exact Date)   SpO2 99%   BMI 22.47 kg/m    General: Well hydrated. No acute distress.  CV: RRR  Lung: Non-labored breathing on RA    TASH deferred      ASSESSMENT    This is a 29 year old F with a transsphincteric fistula s/p seton placement. She has been experiencing a lot of drainage from the external opening, thus I explained her we should probably postpone her case to give her the best chance of success.     All pertinent labs and imaging were personally reviewed by me.      PLAN  - 7 days of flagyl  - RTC in 6 weeks to plan next steps      20 minutes spent on the date of the encounter doing chart review, history and exam, imaging review, documentation and further activities as noted above.      James Mcdonough MD    Division of Colon and Rectal Surgery  [facility]    Referring Provider:  No referring provider defined for this encounter.     Primary Care Provider:  Fabio Davey

## 2024-02-13 ENCOUNTER — OFFICE VISIT (OUTPATIENT)
Dept: SURGERY | Facility: CLINIC | Age: 29
End: 2024-02-13
Payer: COMMERCIAL

## 2024-02-13 VITALS
TEMPERATURE: 98.6 F | OXYGEN SATURATION: 99 % | HEART RATE: 70 BPM | WEIGHT: 135 LBS | BODY MASS INDEX: 22.49 KG/M2 | SYSTOLIC BLOOD PRESSURE: 138 MMHG | HEIGHT: 65 IN | DIASTOLIC BLOOD PRESSURE: 80 MMHG

## 2024-02-13 DIAGNOSIS — K60.30 ANAL FISTULA: ICD-10-CM

## 2024-02-13 DIAGNOSIS — K61.0 PERIANAL ABSCESS: Primary | ICD-10-CM

## 2024-02-13 PROCEDURE — 99213 OFFICE O/P EST LOW 20 MIN: CPT | Performed by: SURGERY

## 2024-02-13 RX ORDER — METRONIDAZOLE 500 MG/1
500 TABLET ORAL 3 TIMES DAILY
Qty: 21 TABLET | Refills: 0 | Status: SHIPPED | OUTPATIENT
Start: 2024-02-13 | End: 2024-02-20

## 2024-02-13 ASSESSMENT — PAIN SCALES - GENERAL: PAINLEVEL: NO PAIN (0)

## 2024-02-13 NOTE — LETTER
2024       RE: Elena Merrill   [place] [address]     Dear Colleague,    Thank you for referring your patient, Elena Merrill, to the [facility] COLON AND RECTAL SURGERY CLINIC [place] [facility]. Please see a copy of my visit note below.    Colon and Rectal Surgery Clinic Note    RE: Elena Merrill.  : 1995.  JOSIAH: 2024.    Reason for visit: anal fistula.    HPI: Elena Merrill is a 29 year old female who presents today for an anal fistula. She has a past medical history of asthma. I took her to the OR on 2024 for a seton placement. MRI pelvis on 2024 demonstrated a transsphincteric left perianal fistula.     Today Elena feels fine. She has a lot of drainage around the seton.     MRI Pelvis (2024):  Impression:      1. 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      2. No discrete rim-enhancing collection/abscess identified.     3. Mildly thickened appearance of uterine junction zone on limited  assessment, non-specific, can be seen in the setting of adenomyosis.     Medical history:  Asthma    Surgical history:  Seton placement     Family history:  Family History   Problem Relation Age of Onset    Breast Cancer Other         Maternal Aunt    Breast Cancer Other         Maternal Aunt    Colon Cancer Other         Maternal Aunt    Asthma Brother        Medications:  Current Outpatient Medications   Medication Sig Dispense Refill    metroNIDAZOLE (FLAGYL) 500 MG tablet Take 1 tablet (500 mg) by mouth 3 times daily for 7 days 21 tablet 0    levonorgestrel-ethinyl estradiol (AVIANE) 0.1-20 MG-MCG tablet Take 1 tablet by mouth daily      traZODone (DESYREL) 100 MG tablet Take  mg by mouth         Allergies:  No Known Allergies    Social history:   Social History     Tobacco Use    Smoking status: Never    Smokeless tobacco: Never   Substance Use  "Topics    Alcohol use: Yes     Marital status: .    ROS:  A complete review of systems was performed with the patient and all systems negative except as per HPI.    Physical Examination:  Exam was chaperoned by Michael Bonner, EMT-P  /80   Pulse 70   Temp 98.6  F (37  C)   Ht 1.651 m (5' 5\")   Wt 61.2 kg (135 lb)   LMP 01/20/2024 (Exact Date)   SpO2 99%   BMI 22.47 kg/m    General: Well hydrated. No acute distress.  CV: RRR  Lung: Non-labored breathing on RA    TASH deferred      ASSESSMENT    This is a 29 year old F with a transsphincteric fistula s/p seton placement. She has been experiencing a lot of drainage from the external opening, thus I explained her we should probably postpone her case to give her the best chance of success.     All pertinent labs and imaging were personally reviewed by me.      PLAN  - 7 days of flagyl  - RTC in 6 weeks to plan next steps      20 minutes spent on the date of the encounter doing chart review, history and exam, imaging review, documentation and further activities as noted above.        Referring Provider:  No referring provider defined for this encounter.     Primary Care Provider:  Fabio Davey       Again, thank you for allowing me to participate in the care of your patient.      Sincerely,    James Mcdonough MD    "

## 2024-02-16 ENCOUNTER — TELEPHONE (OUTPATIENT)
Dept: SURGERY | Facility: CLINIC | Age: 29
End: 2024-02-16
Payer: COMMERCIAL

## 2024-02-16 NOTE — TELEPHONE ENCOUNTER
Per message from Dr. Mcdonough/Patricia, RNCC, canceled surgery with Dr. Mcdonough on 2/19/2024, surgery will be postponed until further notice from Dr. Mcdonough.    Heavenly Dozier  Amanda-op Coordinator  [place]-Rectal Surgery  Direct Phone: [phone]

## 2024-03-28 NOTE — PROGRESS NOTES
Colon and Rectal Surgery Clinic Note    RE: Elena Merrill.  : 1995.  JOSIAH: 2024.    Reason for visit: anal fistula.    HPI: Elena Merrill is a 29 year old female who presents today for an anal fistula. She has a past medical history of asthma. I took her to the OR on 2024 for a seton placement. MRI pelvis on 2024 demonstrated a transsphincteric left perianal fistula.      Interval History: At our last visit I gave her a course of flagyl for increased drainage from her anal fistula. She has noted a significant decrease in drainage over the past few weeks.     MRI Pelvis (2024):  Impression:      1. 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      2. No discrete rim-enhancing collection/abscess identified.     3. Mildly thickened appearance of uterine junction zone on limited  assessment, non-specific, can be seen in the setting of adenomyosis.     Medications:  Current Outpatient Medications   Medication Sig Dispense Refill    levonorgestrel-ethinyl estradiol (AVIANE) 0.1-20 MG-MCG tablet Take 1 tablet by mouth daily      traZODone (DESYREL) 100 MG tablet Take  mg by mouth         Allergies:  No Known Allergies    Social history:   Social History     Tobacco Use    Smoking status: Never    Smokeless tobacco: Never   Substance Use Topics    Alcohol use: Yes     Marital status: .    ROS:  A complete review of systems was performed with the patient and all systems negative except as per HPI.    Physical Examination:  Exam was chaperoned by Michael Bonner EMT-P  /74 (BP Location: Left arm, Patient Position: Sitting)   Pulse 87   SpO2 98%   General: Well hydrated. No acute distress.  PE: deferred      ASSESSMENT     This is a 29 year old F with a transsphincteric fistula s/p seton placement. A LIFT procedure is now indicated to remove her seton, which could otherwise remain in place. We talked about her alternative options  and the risks of this procedure, mainly related to the fairly high failure rates (~30%) requiring additional procedures. She asked appropriate questions, which were answered. Elena agrees to proceed with surgical intervention.     All pertinent labs and imaging were personally reviewed by me.        PLAN  - To OR for EUA and LIFT       30 minutes spent on the date of the encounter doing chart review, history and exam, imaging review, documentation and further activities as noted above.      James Mcdonough MD    Division of Colon and Rectal Surgery  [facility]    Referring Provider:  No referring provider defined for this encounter.     Primary Care Provider:  Denisa Esposito

## 2024-04-09 ENCOUNTER — OFFICE VISIT (OUTPATIENT)
Dept: SURGERY | Facility: CLINIC | Age: 29
End: 2024-04-09
Payer: COMMERCIAL

## 2024-04-09 VITALS — DIASTOLIC BLOOD PRESSURE: 74 MMHG | SYSTOLIC BLOOD PRESSURE: 119 MMHG | HEART RATE: 87 BPM | OXYGEN SATURATION: 98 %

## 2024-04-09 DIAGNOSIS — K60.329 TRANSSPHINCTERIC ANAL FISTULA: Primary | ICD-10-CM

## 2024-04-09 PROCEDURE — 99214 OFFICE O/P EST MOD 30 MIN: CPT | Performed by: SURGERY

## 2024-04-09 ASSESSMENT — PAIN SCALES - GENERAL: PAINLEVEL: NO PAIN (0)

## 2024-04-09 NOTE — PATIENT INSTRUCTIONS
Follow up:    You can call Heavenly, our surgery scheduler, directly to start the scheduling process.    Appointment you will need in prep: pre op physical with our anesthesia team or your PCP  You will need to do a 2 fleet enema bowel prep the day of surgery. You will receive the instructions in a surgery packet via mail and [employer].    PREETHI Gomez [phone]    Clinic Fax Number [phone]    Surgery Scheduling (Heavenly) [phone]    My Chart is available 24 hours a day and is a secure way to access your records and communicate with your care team.  I strongly recommend signing up if you haven't already done so, if you are comfortable with computers.  If you would like to inquire about this or are having problems with My Chart access, you may call [phone] or go online at [email or web address].  Please allow at least 24 hours for a response and extra time on weekends and Holidays.

## 2024-04-09 NOTE — NURSING NOTE
Chief Complaint   Patient presents with    Consult       Vitals:    04/09/24 1111   BP: 119/74   BP Location: Left arm   Patient Position: Sitting   Pulse: 87   SpO2: 98%       There is no height or weight on file to calculate BMI.    Michael Bonner EMT-P

## 2024-04-09 NOTE — LETTER
2024       RE: Elena Merrill   [place] [address]     Dear Colleague,    Thank you for referring your patient, Elena Merrill, to the [facility] COLON AND RECTAL SURGERY CLINIC [place] at [facility]. Please see a copy of my visit note below.    Colon and Rectal Surgery Clinic Note    RE: Elena Merrill.  : 1995.  JOSIAH: 2024.    Reason for visit: anal fistula.    HPI: Elena Merrill is a 29 year old female who presents today for an anal fistula. She has a past medical history of asthma. I took her to the OR on 2024 for a seton placement. MRI pelvis on 2024 demonstrated a transsphincteric left perianal fistula.      Interval History: At our last visit I gave her a course of flagyl for increased drainage from her anal fistula. She has noted a significant decrease in drainage over the past few weeks.     MRI Pelvis (2024):  Impression:      1. 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      2. No discrete rim-enhancing collection/abscess identified.     3. Mildly thickened appearance of uterine junction zone on limited  assessment, non-specific, can be seen in the setting of adenomyosis.     Medications:  Current Outpatient Medications   Medication Sig Dispense Refill    levonorgestrel-ethinyl estradiol (AVIANE) 0.1-20 MG-MCG tablet Take 1 tablet by mouth daily      traZODone (DESYREL) 100 MG tablet Take  mg by mouth         Allergies:  No Known Allergies    Social history:   Social History     Tobacco Use    Smoking status: Never    Smokeless tobacco: Never   Substance Use Topics    Alcohol use: Yes     Marital status: .    ROS:  A complete review of systems was performed with the patient and all systems negative except as per HPI.    Physical Examination:  Exam was chaperoned by Michael Bonner, EMT-P  /74 (BP Location: Left arm, Patient  Position: Sitting)   Pulse 87   SpO2 98%   General: Well hydrated. No acute distress.  PE: deferred      ASSESSMENT     This is a 29 year old F with a transsphincteric fistula s/p seton placement. A LIFT procedure is now indicated to remove her seton, which could otherwise remain in place. We talked about her alternative options and the risks of this procedure, mainly related to the fairly high failure rates (~30%) requiring additional procedures. She asked appropriate questions, which were answered. Elena agrees to proceed with surgical intervention.     All pertinent labs and imaging were personally reviewed by me.        PLAN  - To OR for EUA and LIFT       30 minutes spent on the date of the encounter doing chart review, history and exam, imaging review, documentation and further activities as noted above.        Referring Provider:  No referring provider defined for this encounter.     Primary Care Provider:  Denisa Esposito      Again, thank you for allowing me to participate in the care of your patient.      Sincerely,    James Mcdonough MD

## 2024-04-10 ENCOUNTER — TELEPHONE (OUTPATIENT)
Dept: SURGERY | Facility: CLINIC | Age: 29
End: 2024-04-10
Payer: COMMERCIAL

## 2024-04-10 NOTE — TELEPHONE ENCOUNTER
Patient is scheduled for surgery with Dr. James Mcdonough     Spoke with: Elena    Date of Surgery: Friday May 24, 2024    Location: [facility] OR    Informed patient they will need an adult  YES    Pre op with Provider Dr. James Mcdonough     Upcoming Related Appointments:     Pre-operative Physical:  - clinic appointment to be scheduled by patient with her PCP Dr. Denisa Esposito, or a partner in clinic, at [facility] 03, 2024 10:30 AM  (Arrive by 10:15 AM)  Post Op with SULEIMAN Lacey [facility] Colon and Rectal Surgery Clinic [facility] [phone]    [address] [place] [address]      Surgery packet: sent via [employer] and Mail     Additional comments:   - patient would like surgery on a sooner date if anything opens up    Heavenly Dozier  Amanda-op Coordinator  [place]-Rectal Surgery  Direct Phone: [phone]

## 2024-04-28 ENCOUNTER — HEALTH MAINTENANCE LETTER (OUTPATIENT)
Age: 29
End: 2024-04-28

## 2024-05-06 ENCOUNTER — TELEPHONE (OUTPATIENT)
Dept: SURGERY | Facility: CLINIC | Age: 29
End: 2024-05-06
Payer: COMMERCIAL

## 2024-05-06 NOTE — TELEPHONE ENCOUNTER
Patient is rescheduled for surgery with Dr. James Mcdonough      Spoke with: Elena Obregon Date of Surgery: Thursday May 23, 2024     Location: [facility]     Informed patient they will need an adult  YES     Pre op with Provider Dr. James Mcdonough      Upcoming Related Appointments:     Thu May 16, 2024  9:30 AM  (Arrive by 9:10 AM)  Pre-Operative Physical with Luli Gonzales MD  [facility]  [phone]    [address] [place] [address] [address]  [facility] [address]     Mon Jun 03, 2024 10:30 AM  (Arrive by 10:15 AM)  Post Op with SULEIMAN Lacey CNP  [facility] Colon and Rectal Surgery Clinic [facility] [place] [phone]    [address] [place] [address]       Surgery packet: sent via [email or web address] and Mail      Additional comments:   - Surgery rescheduled from 5/24 to 5/23/2024  - patient would like surgery on a sooner date if anything opens up     Heavenly Dozier  Amanda-op Coordinator  [place]-Rectal Surgery  Direct Phone: [phone]

## 2024-05-10 NOTE — TELEPHONE ENCOUNTER
Patient is scheduled for surgery with Dr. James Mcdonough    Spoke with: Elena    Date of Surgery: 5/23/2024    Location: [facility] OR    Informed patient they will need an adult  YES    Pre op with Provider Dr. James Mcdonough     Upcoming Related Appointments:      Thu May 16, 2024  9:30 AM  (Arrive by 9:10 AM)  Pre-Operative Physical with Luli Gonzales MD  [facility]  [phone]     [address]  [address]  [facility] [address]      Mon Jun 03, 2024 10:30 AM  (Arrive by 10:15 AM)  Post Op with SULEIMAN Lacey CNP  [facility] Colon and Rectal Surgery Clinic [facility] [phone]     [address] [place] [address]      Surgery packet: sent via [employer] and Mail     Heavenly Dozier  Amanda-op Coordinator  [place]-Rectal Surgery  Direct Phone: [phone]

## 2024-05-16 ENCOUNTER — OFFICE VISIT (OUTPATIENT)
Dept: PEDIATRICS | Facility: CLINIC | Age: 29
End: 2024-05-16
Payer: COMMERCIAL

## 2024-05-16 VITALS
RESPIRATION RATE: 16 BRPM | TEMPERATURE: 97.7 F | OXYGEN SATURATION: 96 % | HEART RATE: 80 BPM | SYSTOLIC BLOOD PRESSURE: 108 MMHG | WEIGHT: 129.9 LBS | DIASTOLIC BLOOD PRESSURE: 66 MMHG | BODY MASS INDEX: 21.64 KG/M2 | HEIGHT: 65 IN

## 2024-05-16 DIAGNOSIS — K60.30 ANAL FISTULA: ICD-10-CM

## 2024-05-16 DIAGNOSIS — Z01.818 PREOP GENERAL PHYSICAL EXAM: Primary | ICD-10-CM

## 2024-05-16 LAB — HCG UR QL: NEGATIVE

## 2024-05-16 PROCEDURE — 99214 OFFICE O/P EST MOD 30 MIN: CPT | Performed by: PEDIATRICS

## 2024-05-16 PROCEDURE — 81025 URINE PREGNANCY TEST: CPT | Performed by: PEDIATRICS

## 2024-05-16 ASSESSMENT — PAIN SCALES - GENERAL: PAINLEVEL: NO PAIN (1)

## 2024-05-16 NOTE — PATIENT INSTRUCTIONS

## 2024-05-16 NOTE — PROGRESS NOTES
Preoperative Evaluation  [facility] [place]  [address]  [address]  [place] [address]  Phone: [phone]  Fax: [phone]  Primary Provider: Denisa Esposito MD  Pre-op Performing Provider: Luli Gonzales MD  May 16, 2024       Surgical Information  Surgery/Procedure: LIGATION, FISTULA TRACT, INTERSPHINCTERIC   Surgery Location:   [facility]   Surgeon: James Mcdonough MD (Primary), Marino Kinney MD (Assisting)  Surgery Date: 5/23/2024  Time of Surgery: 7:15AM  Where patient plans to recover: At home with family  Fax number for surgical facility: Note does not need to be faxed, will be available electronically in Epic.    Assessment & Plan     The proposed surgical procedure is considered INTERMEDIATE risk.    (Z01.818) Preop general physical exam  (primary encounter diagnosis)  Comment:   Plan: HCG qualitative urine            (K60.3) Anal fistula  Comment:   Plan:             - No identified additional risk factors other than previously addressed    Preoperative Medication Instructions  Antiplatelet or Anticoagulation Medication Instructions   - Patient is on no antiplatelet or anticoagulation medications.    Additional Medication Instructions  Take all scheduled medications on the day of surgery EXCEPT for modifications listed below:   - ibuprofen (Advil, Motrin): DO NOT TAKE 1 day before surgery.     Recommendation  Approval given to proceed with proposed procedure, without further diagnostic evaluation.        Laila Parker is a 29 year old, presenting for the following:  Pre-Op Exam          5/16/2024     9:15 AM   Additional Questions   Roomed by MARLENA Williamson   Accompanied by no         5/16/2024     9:15 AM   Patient Reported Additional Medications   Patient reports taking the following new medications no     HPI related to upcoming procedure: Patient with anal fistula.         5/13/2024   Pre-Op  Questionnaire   Have you ever had a heart attack or stroke? No   Have you ever had surgery on your heart or blood vessels, such as a stent placement, a coronary artery bypass, or surgery on an artery in your head, neck, heart, or legs? No   Do you have chest pain with activity? No   Do you have a history of heart failure? No   Do you currently have a cold, bronchitis or symptoms of other infection? No   Do you have a cough, shortness of breath, or wheezing? No   Do you or anyone in your family have previous history of blood clots? No   Do you or does anyone in your family have a serious bleeding problem such as prolonged bleeding following surgeries or cuts? No   Have you ever had problems with anemia or been told to take iron pills? No   Have you had any abnormal blood loss such as black, tarry or bloody stools, or abnormal vaginal bleeding? No   Have you ever had a blood transfusion? No   Are you willing to have a blood transfusion if it is medically needed before, during, or after your surgery? Yes   Have you or any of your relatives ever had problems with anesthesia? YES - Dad with recent appendectomy - almost  due to hypotension. Last surgery that patient had her pulse was very elevated.    Do you have sleep apnea, excessive snoring or daytime drowsiness? No   Do you have any artifical heart valves or other implanted medical devices like a pacemaker, defibrillator, or continuous glucose monitor? No   Do you have artificial joints? No   Are you allergic to latex? No   Is there any chance that you may be pregnant? No     Health Care Directive  Patient does not have a Health Care Directive or Living Will: Discussed advance care planning with patient; information given to patient to review.    Preoperative Review of    reviewed - no record of controlled substances prescribed.          Patient Active Problem List    Diagnosis Date Noted    Anal fistula 10/23/2023     Priority: Medium      Past Medical  "History:   Diagnosis Date    Uncomplicated asthma     It was when I was younger.     Past Surgical History:   Procedure Laterality Date    FISTULOTOMY RECTUM N/A 12/6/2023    Procedure: Exam under anesthesia and SETON PLACEMENT;  Surgeon: James Mcdonough MD;  Location: [facility] OR     Current Outpatient Medications   Medication Sig Dispense Refill    traZODone (DESYREL) 100 MG tablet Take  mg by mouth         No Known Allergies     Social History     Tobacco Use    Smoking status: Never     Passive exposure: Never    Smokeless tobacco: Never   Substance Use Topics    Alcohol use: Yes       History   Drug Use Unknown               Objective    /66 (BP Location: Right arm, Patient Position: Sitting, Cuff Size: Adult Regular)   Pulse 80   Temp 97.7  F (36.5  C) (Tympanic)   Resp 16   Ht 1.646 m (5' 4.8\")   Wt 58.9 kg (129 lb 14.4 oz)   LMP 05/10/2024   SpO2 96%   Breastfeeding No   BMI 21.75 kg/m     Estimated body mass index is 21.75 kg/m  as calculated from the following:    Height as of this encounter: 1.646 m (5' 4.8\").    Weight as of this encounter: 58.9 kg (129 lb 14.4 oz).  Physical Exam  GENERAL: alert and no distress  EYES: Eyes grossly normal to inspection, PERRL and conjunctivae and sclerae normal  HENT: ear canals and TM's normal, nose and mouth without ulcers or lesions  NECK: no adenopathy, no asymmetry, masses, or scars  RESP: lungs clear to auscultation - no rales, rhonchi or wheezes  CV: regular rate and rhythm, normal S1 S2, no S3 or S4, no murmur, click or rub, no peripheral edema  ABDOMEN: soft, nontender, no hepatosplenomegaly, no masses and bowel sounds normal  MS: no gross musculoskeletal defects noted, no edema  SKIN: no suspicious lesions or rashes  NEURO: Normal strength and tone, mentation intact and speech normal  PSYCH: mentation appears normal, affect normal/bright      Diagnostics  Labs pending at this time.  Results will be reviewed when available.   No EKG required, " no history of coronary heart disease, significant arrhythmia, peripheral arterial disease or other structural heart disease.    Revised Cardiac Risk Index (RCRI)  The patient has the following serious cardiovascular risks for perioperative complications:   - No serious cardiac risks = 0 points     RCRI Interpretation: 0 points: Class I (very low risk - 0.4% complication rate)         Signed Electronically by: Luli Gonzales MD  Copy of this evaluation report is provided to requesting physician.

## 2024-05-22 ENCOUNTER — ANESTHESIA EVENT (OUTPATIENT)
Dept: SURGERY | Facility: AMBULATORY SURGERY CENTER | Age: 29
End: 2024-05-22
Payer: COMMERCIAL

## 2024-05-22 NOTE — ANESTHESIA PREPROCEDURE EVALUATION
"Anesthesia Pre-Procedure Evaluation    Patient: Elena Merrill   MRN: [id number] : 1995        Procedure : Procedure(s):  LIGATION, FISTULA TRACT, INTERSPHINCTERIC          Past Medical History:   Diagnosis Date    Uncomplicated asthma     It was when I was younger.      Past Surgical History:   Procedure Laterality Date    FISTULOTOMY RECTUM N/A 2023    Procedure: Exam under anesthesia and SETON PLACEMENT;  Surgeon: James Mcdonough MD;  Location: [facility] OR      No Known Allergies   Social History     Tobacco Use    Smoking status: Never     Passive exposure: Never    Smokeless tobacco: Never   Substance Use Topics    Alcohol use: Yes      Wt Readings from Last 1 Encounters:   24 58.9 kg (129 lb 14.4 oz)           Physical Exam    Airway        Mallampati: II   TM distance: > 3 FB   Neck ROM: full   Mouth opening: > 3 cm    Respiratory Devices and Support         Dental       (+) Minor Abnormalities - some fillings, tiny chips      Cardiovascular   cardiovascular exam normal          Pulmonary   pulmonary exam normal                OUTSIDE LABS:  CBC:   Lab Results   Component Value Date    WBC 14.8 (H) 10/03/2023    HGB 13.4 10/03/2023    HCT 40.0 10/03/2023     10/03/2023     BMP:   Lab Results   Component Value Date     (L) 10/03/2023    POTASSIUM 3.5 10/03/2023    CHLORIDE 102 10/03/2023    CO2 20 (L) 10/03/2023    BUN 11.2 10/03/2023    CR 0.73 10/03/2023    GLC 87 10/03/2023     COAGS: No results found for: \"PTT\", \"INR\", \"FIBR\"  POC:   Lab Results   Component Value Date    HCG Negative 2024     HEPATIC: No results found for: \"ALBUMIN\", \"PROTTOTAL\", \"ALT\", \"AST\", \"GGT\", \"ALKPHOS\", \"BILITOTAL\", \"BILIDIRECT\", \"KINZA\"  OTHER:   Lab Results   Component Value Date    CLAUDIA 9.4 10/03/2023       Anesthesia Plan    ASA Status:  2    NPO Status:  NPO Appropriate    Anesthesia Type: General.     - Airway: ETT   Induction: Intravenous, Propofol.   Maintenance: Balanced.    "     Consents    Anesthesia Plan(s) and associated risks, benefits, and realistic alternatives discussed. Questions answered and patient/representative(s) expressed understanding.     - Discussed: Risks, Benefits and Alternatives for BOTH SEDATION and the PROCEDURE were discussed     - Discussed with:  Patient      - Extended Intubation/Ventilatory Support Discussed: No.      - Patient is DNR/DNI Status: No     Use of blood products discussed: No .     Postoperative Care    Pain management: IV analgesics, Oral pain medications, Multi-modal analgesia.   PONV prophylaxis: Ondansetron (or other 5HT-3), Dexamethasone or Solumedrol, Background Propofol Infusion     Comments:               Sonu Alex MD    I have reviewed the pertinent notes and labs in the chart from the past 30 days and (re)examined the patient.  Any updates or changes from those notes are reflected in this note.

## 2024-05-23 ENCOUNTER — ANESTHESIA (OUTPATIENT)
Dept: SURGERY | Facility: AMBULATORY SURGERY CENTER | Age: 29
End: 2024-05-23
Payer: COMMERCIAL

## 2024-05-23 ENCOUNTER — HOSPITAL ENCOUNTER (OUTPATIENT)
Facility: AMBULATORY SURGERY CENTER | Age: 29
Discharge: HOME | End: 2024-05-23
Attending: SURGERY | Admitting: SURGERY
Payer: COMMERCIAL

## 2024-05-23 VITALS
OXYGEN SATURATION: 97 % | DIASTOLIC BLOOD PRESSURE: 65 MMHG | RESPIRATION RATE: 14 BRPM | BODY MASS INDEX: 22.49 KG/M2 | SYSTOLIC BLOOD PRESSURE: 102 MMHG | TEMPERATURE: 97.6 F | WEIGHT: 135 LBS | HEIGHT: 65 IN

## 2024-05-23 DIAGNOSIS — K60.30 ANAL FISTULA: Primary | ICD-10-CM

## 2024-05-23 PROCEDURE — 46275 REMOVE ANAL FIST INTER: CPT | Performed by: NURSE ANESTHETIST, CERTIFIED REGISTERED

## 2024-05-23 PROCEDURE — 81025 URINE PREGNANCY TEST: CPT | Performed by: PATHOLOGY

## 2024-05-23 PROCEDURE — 46275 REMOVE ANAL FIST INTER: CPT | Mod: GC | Performed by: SURGERY

## 2024-05-23 PROCEDURE — 46275 REMOVE ANAL FIST INTER: CPT | Performed by: STUDENT IN AN ORGANIZED HEALTH CARE EDUCATION/TRAINING PROGRAM

## 2024-05-23 PROCEDURE — 46275 REMOVE ANAL FIST INTER: CPT

## 2024-05-23 RX ORDER — LORAZEPAM 2 MG/ML
.5-1 INJECTION INTRAMUSCULAR
Status: DISCONTINUED | OUTPATIENT
Start: 2024-05-23 | End: 2024-05-24 | Stop reason: HOSPADM

## 2024-05-23 RX ORDER — SODIUM CHLORIDE, SODIUM LACTATE, POTASSIUM CHLORIDE, CALCIUM CHLORIDE 600; 310; 30; 20 MG/100ML; MG/100ML; MG/100ML; MG/100ML
INJECTION, SOLUTION INTRAVENOUS CONTINUOUS
Status: DISCONTINUED | OUTPATIENT
Start: 2024-05-23 | End: 2024-05-23 | Stop reason: HOSPADM

## 2024-05-23 RX ORDER — FENTANYL CITRATE 50 UG/ML
50 INJECTION, SOLUTION INTRAMUSCULAR; INTRAVENOUS EVERY 5 MIN PRN
Status: DISCONTINUED | OUTPATIENT
Start: 2024-05-23 | End: 2024-05-24 | Stop reason: HOSPADM

## 2024-05-23 RX ORDER — DEXAMETHASONE SODIUM PHOSPHATE 4 MG/ML
INJECTION, SOLUTION INTRA-ARTICULAR; INTRALESIONAL; INTRAMUSCULAR; INTRAVENOUS; SOFT TISSUE PRN
Status: DISCONTINUED | OUTPATIENT
Start: 2024-05-23 | End: 2024-05-23

## 2024-05-23 RX ORDER — ONDANSETRON 4 MG/1
4 TABLET, ORALLY DISINTEGRATING ORAL EVERY 30 MIN PRN
Status: DISCONTINUED | OUTPATIENT
Start: 2024-05-23 | End: 2024-05-24 | Stop reason: HOSPADM

## 2024-05-23 RX ORDER — PROPOFOL 10 MG/ML
INJECTION, EMULSION INTRAVENOUS CONTINUOUS PRN
Status: DISCONTINUED | OUTPATIENT
Start: 2024-05-23 | End: 2024-05-23

## 2024-05-23 RX ORDER — ONDANSETRON 2 MG/ML
4 INJECTION INTRAMUSCULAR; INTRAVENOUS EVERY 30 MIN PRN
Status: DISCONTINUED | OUTPATIENT
Start: 2024-05-23 | End: 2024-05-24 | Stop reason: HOSPADM

## 2024-05-23 RX ORDER — MEPERIDINE HYDROCHLORIDE 25 MG/ML
12.5 INJECTION INTRAMUSCULAR; INTRAVENOUS; SUBCUTANEOUS EVERY 5 MIN PRN
Status: DISCONTINUED | OUTPATIENT
Start: 2024-05-23 | End: 2024-05-24 | Stop reason: HOSPADM

## 2024-05-23 RX ORDER — DEXAMETHASONE SODIUM PHOSPHATE 10 MG/ML
4 INJECTION, SOLUTION INTRAMUSCULAR; INTRAVENOUS
Status: DISCONTINUED | OUTPATIENT
Start: 2024-05-23 | End: 2024-05-24 | Stop reason: HOSPADM

## 2024-05-23 RX ORDER — ACETAMINOPHEN 325 MG/1
975 TABLET ORAL ONCE
Status: DISCONTINUED | OUTPATIENT
Start: 2024-05-23 | End: 2024-05-24 | Stop reason: HOSPADM

## 2024-05-23 RX ORDER — CIPROFLOXACIN 500 MG/1
500 TABLET, FILM COATED ORAL 2 TIMES DAILY
Qty: 14 TABLET | Refills: 0 | Status: SHIPPED | OUTPATIENT
Start: 2024-05-23 | End: 2024-05-23

## 2024-05-23 RX ORDER — NALOXONE HYDROCHLORIDE 0.4 MG/ML
0.1 INJECTION, SOLUTION INTRAMUSCULAR; INTRAVENOUS; SUBCUTANEOUS
Status: DISCONTINUED | OUTPATIENT
Start: 2024-05-23 | End: 2024-05-24 | Stop reason: HOSPADM

## 2024-05-23 RX ORDER — ACETAMINOPHEN 325 MG/1
975 TABLET ORAL ONCE
Status: COMPLETED | OUTPATIENT
Start: 2024-05-23 | End: 2024-05-23

## 2024-05-23 RX ORDER — SODIUM CHLORIDE, SODIUM LACTATE, POTASSIUM CHLORIDE, CALCIUM CHLORIDE 600; 310; 30; 20 MG/100ML; MG/100ML; MG/100ML; MG/100ML
INJECTION, SOLUTION INTRAVENOUS CONTINUOUS
Status: DISCONTINUED | OUTPATIENT
Start: 2024-05-23 | End: 2024-05-24 | Stop reason: HOSPADM

## 2024-05-23 RX ORDER — LIDOCAINE 40 MG/G
CREAM TOPICAL
Status: DISCONTINUED | OUTPATIENT
Start: 2024-05-23 | End: 2024-05-23 | Stop reason: HOSPADM

## 2024-05-23 RX ORDER — CEFAZOLIN SODIUM 2 G/50ML
2 SOLUTION INTRAVENOUS
Status: COMPLETED | OUTPATIENT
Start: 2024-05-23 | End: 2024-05-23

## 2024-05-23 RX ORDER — HYDROMORPHONE HYDROCHLORIDE 1 MG/ML
0.2 INJECTION, SOLUTION INTRAMUSCULAR; INTRAVENOUS; SUBCUTANEOUS EVERY 5 MIN PRN
Status: DISCONTINUED | OUTPATIENT
Start: 2024-05-23 | End: 2024-05-24 | Stop reason: HOSPADM

## 2024-05-23 RX ORDER — ALBUTEROL SULFATE 0.83 MG/ML
2.5 SOLUTION RESPIRATORY (INHALATION) EVERY 4 HOURS PRN
Status: DISCONTINUED | OUTPATIENT
Start: 2024-05-23 | End: 2024-05-24 | Stop reason: HOSPADM

## 2024-05-23 RX ORDER — DIPHENHYDRAMINE HCL 25 MG
25 CAPSULE ORAL EVERY 6 HOURS PRN
Status: DISCONTINUED | OUTPATIENT
Start: 2024-05-23 | End: 2024-05-24 | Stop reason: HOSPADM

## 2024-05-23 RX ORDER — DIPHENHYDRAMINE HYDROCHLORIDE 50 MG/ML
25 INJECTION INTRAMUSCULAR; INTRAVENOUS EVERY 6 HOURS PRN
Status: DISCONTINUED | OUTPATIENT
Start: 2024-05-23 | End: 2024-05-24 | Stop reason: HOSPADM

## 2024-05-23 RX ORDER — CIPROFLOXACIN 500 MG/1
500 TABLET, FILM COATED ORAL 2 TIMES DAILY
Qty: 14 TABLET | Refills: 0 | Status: SHIPPED | OUTPATIENT
Start: 2024-05-23 | End: 2024-08-07

## 2024-05-23 RX ORDER — HYDROMORPHONE HYDROCHLORIDE 1 MG/ML
0.4 INJECTION, SOLUTION INTRAMUSCULAR; INTRAVENOUS; SUBCUTANEOUS EVERY 5 MIN PRN
Status: DISCONTINUED | OUTPATIENT
Start: 2024-05-23 | End: 2024-05-24 | Stop reason: HOSPADM

## 2024-05-23 RX ORDER — GINSENG 100 MG
CAPSULE ORAL PRN
Status: DISCONTINUED | OUTPATIENT
Start: 2024-05-23 | End: 2024-05-23 | Stop reason: HOSPADM

## 2024-05-23 RX ORDER — OXYCODONE HYDROCHLORIDE 5 MG/1
5 TABLET ORAL EVERY 6 HOURS PRN
Qty: 12 TABLET | Refills: 0 | Status: SHIPPED | OUTPATIENT
Start: 2024-05-23 | End: 2024-05-26

## 2024-05-23 RX ORDER — METRONIDAZOLE 250 MG/1
250 TABLET ORAL 3 TIMES DAILY
Qty: 15 TABLET | Refills: 0 | Status: SHIPPED | OUTPATIENT
Start: 2024-05-23 | End: 2024-08-07

## 2024-05-23 RX ORDER — HYDRALAZINE HYDROCHLORIDE 20 MG/ML
2.5-5 INJECTION INTRAMUSCULAR; INTRAVENOUS EVERY 10 MIN PRN
Status: DISCONTINUED | OUTPATIENT
Start: 2024-05-23 | End: 2024-05-24 | Stop reason: HOSPADM

## 2024-05-23 RX ORDER — OXYCODONE HYDROCHLORIDE 5 MG/1
5 TABLET ORAL
Status: DISCONTINUED | OUTPATIENT
Start: 2024-05-23 | End: 2024-05-24 | Stop reason: HOSPADM

## 2024-05-23 RX ORDER — KETOROLAC TROMETHAMINE 30 MG/ML
15 INJECTION, SOLUTION INTRAMUSCULAR; INTRAVENOUS
Status: DISCONTINUED | OUTPATIENT
Start: 2024-05-23 | End: 2024-05-24 | Stop reason: HOSPADM

## 2024-05-23 RX ORDER — FENTANYL CITRATE 50 UG/ML
25 INJECTION, SOLUTION INTRAMUSCULAR; INTRAVENOUS
Status: DISCONTINUED | OUTPATIENT
Start: 2024-05-23 | End: 2024-05-24 | Stop reason: HOSPADM

## 2024-05-23 RX ORDER — METRONIDAZOLE 250 MG/1
250 TABLET ORAL 3 TIMES DAILY
Qty: 15 TABLET | Refills: 0 | Status: SHIPPED | OUTPATIENT
Start: 2024-05-23 | End: 2024-05-23

## 2024-05-23 RX ORDER — CEFAZOLIN SODIUM 2 G/50ML
2 SOLUTION INTRAVENOUS SEE ADMIN INSTRUCTIONS
Status: DISCONTINUED | OUTPATIENT
Start: 2024-05-23 | End: 2024-05-23 | Stop reason: HOSPADM

## 2024-05-23 RX ORDER — LABETALOL HYDROCHLORIDE 5 MG/ML
10 INJECTION, SOLUTION INTRAVENOUS
Status: DISCONTINUED | OUTPATIENT
Start: 2024-05-23 | End: 2024-05-24 | Stop reason: HOSPADM

## 2024-05-23 RX ORDER — METRONIDAZOLE 500 MG/100ML
500 INJECTION, SOLUTION INTRAVENOUS
Status: COMPLETED | OUTPATIENT
Start: 2024-05-23 | End: 2024-05-23

## 2024-05-23 RX ORDER — ONDANSETRON 2 MG/ML
4 INJECTION INTRAMUSCULAR; INTRAVENOUS ONCE
Status: COMPLETED | OUTPATIENT
Start: 2024-05-23 | End: 2024-05-23

## 2024-05-23 RX ORDER — PROPOFOL 10 MG/ML
INJECTION, EMULSION INTRAVENOUS PRN
Status: DISCONTINUED | OUTPATIENT
Start: 2024-05-23 | End: 2024-05-23

## 2024-05-23 RX ORDER — FENTANYL CITRATE 50 UG/ML
25 INJECTION, SOLUTION INTRAMUSCULAR; INTRAVENOUS EVERY 5 MIN PRN
Status: DISCONTINUED | OUTPATIENT
Start: 2024-05-23 | End: 2024-05-24 | Stop reason: HOSPADM

## 2024-05-23 RX ORDER — LIDOCAINE HYDROCHLORIDE 20 MG/ML
INJECTION, SOLUTION INFILTRATION; PERINEURAL PRN
Status: DISCONTINUED | OUTPATIENT
Start: 2024-05-23 | End: 2024-05-23

## 2024-05-23 RX ORDER — ONDANSETRON 4 MG/1
4 TABLET, ORALLY DISINTEGRATING ORAL
Status: DISCONTINUED | OUTPATIENT
Start: 2024-05-23 | End: 2024-05-24 | Stop reason: HOSPADM

## 2024-05-23 RX ORDER — FENTANYL CITRATE 50 UG/ML
INJECTION, SOLUTION INTRAMUSCULAR; INTRAVENOUS PRN
Status: DISCONTINUED | OUTPATIENT
Start: 2024-05-23 | End: 2024-05-23

## 2024-05-23 RX ORDER — OXYCODONE HYDROCHLORIDE 5 MG/1
10 TABLET ORAL
Status: DISCONTINUED | OUTPATIENT
Start: 2024-05-23 | End: 2024-05-24 | Stop reason: HOSPADM

## 2024-05-23 RX ADMIN — ACETAMINOPHEN 975 MG: 325 TABLET ORAL at 08:24

## 2024-05-23 RX ADMIN — SODIUM CHLORIDE, SODIUM LACTATE, POTASSIUM CHLORIDE, CALCIUM CHLORIDE: 600; 310; 30; 20 INJECTION, SOLUTION INTRAVENOUS at 08:19

## 2024-05-23 RX ADMIN — FENTANYL CITRATE 50 MCG: 50 INJECTION, SOLUTION INTRAMUSCULAR; INTRAVENOUS at 10:32

## 2024-05-23 RX ADMIN — Medication 0.5 MG: at 10:55

## 2024-05-23 RX ADMIN — METRONIDAZOLE 500 MG: 500 INJECTION, SOLUTION INTRAVENOUS at 08:23

## 2024-05-23 RX ADMIN — FENTANYL CITRATE 50 MCG: 50 INJECTION, SOLUTION INTRAMUSCULAR; INTRAVENOUS at 10:47

## 2024-05-23 RX ADMIN — ONDANSETRON 4 MG: 2 INJECTION INTRAMUSCULAR; INTRAVENOUS at 08:54

## 2024-05-23 RX ADMIN — DEXAMETHASONE SODIUM PHOSPHATE 4 MG: 4 INJECTION, SOLUTION INTRA-ARTICULAR; INTRALESIONAL; INTRAMUSCULAR; INTRAVENOUS; SOFT TISSUE at 10:32

## 2024-05-23 RX ADMIN — PROPOFOL 200 MG: 10 INJECTION, EMULSION INTRAVENOUS at 10:32

## 2024-05-23 RX ADMIN — PROPOFOL 200 MCG/KG/MIN: 10 INJECTION, EMULSION INTRAVENOUS at 10:44

## 2024-05-23 RX ADMIN — LIDOCAINE HYDROCHLORIDE 70 MG: 20 INJECTION, SOLUTION INFILTRATION; PERINEURAL at 10:32

## 2024-05-23 RX ADMIN — CEFAZOLIN SODIUM 2 G: 2 SOLUTION INTRAVENOUS at 10:42

## 2024-05-23 NOTE — ANESTHESIA POSTPROCEDURE EVALUATION
Patient: Elena Merrill    Procedure: Procedure(s):  LIGATION, FISTULA TRACT, INTERSPHINCTERIC       Anesthesia Type:  General    Note:  Disposition: Outpatient   Postop Pain Control: Uneventful            Sign Out: Well controlled pain   PONV: No   Neuro/Psych: Uneventful            Sign Out: Acceptable/Baseline neuro status   Airway/Respiratory: Uneventful            Sign Out: Acceptable/Baseline resp. status   CV/Hemodynamics: Uneventful            Sign Out: Acceptable CV status; No obvious hypovolemia; No obvious fluid overload   Other NRE:    DID A NON-ROUTINE EVENT OCCUR?            Last vitals:  Vitals Value Taken Time   /62 05/23/24 1210   Temp 36.9  C (98.4  F) 05/23/24 1210   Pulse 102 05/23/24 1209   Resp 14 05/23/24 1210   SpO2 95 % 05/23/24 1210   Vitals shown include unfiled device data.    Electronically Signed By: Sonu Alex MD  May 23, 2024  12:13 PM

## 2024-05-23 NOTE — ANESTHESIA CARE TRANSFER NOTE
Patient: Elena Merrill    Procedure: Procedure(s):  LIGATION, FISTULA TRACT, INTERSPHINCTERIC       Diagnosis: Anal fistula [K60.3]  Diagnosis Additional Information: No value filed.    Anesthesia Type:   General     Note:    Oropharynx: oropharynx clear of all foreign objects  Level of Consciousness: drowsy  Oxygen Supplementation: face mask  Level of Supplemental Oxygen (L/min / FiO2): 6  Independent Airway: airway patency satisfactory and stable  Dentition: dentition unchanged  Vital Signs Stable: post-procedure vital signs reviewed and stable  Report to RN Given: handoff report given  Patient transferred to: PACU  Comments: Resps easy and reg. Report to PACU RN   Handoff Report: Identifed the Patient, Identified the Reponsible Provider, Reviewed the pertinent medical history, Discussed the surgical course, Reviewed Intra-OP anesthesia mangement and issues during anesthesia, Set expectations for post-procedure period and Allowed opportunity for questions and acknowledgement of understanding      Vitals:  Vitals Value Taken Time   /73 05/23/24 1200   Temp 37  C (98.6  F) 05/23/24 1156   Pulse 105 05/23/24 1201   Resp 9 05/23/24 1201   SpO2 99 % 05/23/24 1201   Vitals shown include unfiled device data.    Electronically Signed By: SULEIMAN Segal CRNA  May 23, 2024  12:02 PM

## 2024-05-23 NOTE — OR NURSING
SETON / YELLOW VESSEL LOOP X 1 PRESENT WHEN PT CAME INTO THE OR. WAS NOT CHARTED IN THE LDA. SETON WAS REMOVED PER DR. KEVIN

## 2024-05-23 NOTE — OP NOTE
"[facility] Colorectal Surgery Operative Report    PREOPERATIVE DIAGNOSIS:  1. Anorectal fistula.    POSTOPERATIVE DIAGNOSIS:   1. Anorectal fistula.    PROCEDURE:  1. Evaluation under anesthesia.  2. Ligation of intersphincteric fistula tract (LIFT) procedure.  3. Removal of seton    ANESTHESIA: General endotracheal anesthesia plus local anesthesia.    Co-SURGEON:  James Mcdonough M.D.; MD Dr Nirmal Jerry assistance was indicated due to the complexity of the procedure    ASSISTANT(S): Vilma Olvera MD, CRS Fellow    INDICATIONS FOR PROCEDURE  Elena Merrill is a 29 year old female who presented with a transsphincteric anorectal fistula. After placement of a non-cutting seton for 6 weeks, definitive operative treatment was recommended. I thoroughly discussed the risks, benefits, and alternatives of operative treatment with the patient and he/she agreed to proceed.     General risks related to anorectal surgery were reviewed with the patient. These include, but are not limited to urinary retention, infection/abscess, bleeding, chronic pain, anal stenosis, recurrent fistula, fissure, and fecal incontinence.     OPERATIVE PROCEDURE: After obtaining informed consent, the patient was brought to the operating room and placed in the prone jackknife position. Appropriate preoperative mechanical deep venous thrombosis prophylaxis, as well as preoperative prophylactic parenteral antibiotics were given. General endotracheal anesthesia was gently induced. Bilateral lower extremity pneumatic compression devices were applied and all pressure points were cushioned. The perianal area was then preped and draped in the standard sterile fashion. After a \"time-out\" was performed, digital rectal exam and anoscopy were performed. The seton was removed and the fistula tract was cannulated with a Garza fistula probe. There was no evidence of active infection or additional fistula tracts or sinuses. The external orifice and " distal fistula tract were curetted. A 3 cm semicircular incision was placed at the intersphincteric groove. A lone-star surgical retractor was placed for adequate exposure. The fibers of the internal and external anal sphincters were identified and care was taken to not injure these structures during the operation. The fistula tract was identified and the surrounding muscle fiber were carefully dissected off of the fistula tract. The fistula tract was encircled x2 with 2-0 Vicryl sutures and the fistula probe was removed. The Vicryl sutures were tied and the fistula tract was transected sharply. Additional figure-of-eight 3-0 Vicryl sutures were placed to reinforce the edges of the transected fistula tract. The proximal and distal closure sites were tested with gentle irrigation of dilute peroxide via the external and internal orifices, respectively. There was no evidence of leakage of peroxide at the intersphincteric space. Surrounding tissue was then interposed in between the blind cut edges of the fistula tract with 3-0 Vicryl sutures. The intersphincteric wound was closed in layers with individual 3-0 Vicryl sutures, buttressing the external and internal anal sphincters over the fistula closure sites. The skin was re approximated with interrupted 3-0 Monocryl. The internal opening in the anal canal was debrided and reapproximated with 2-0 vicryl, and the external opening of the fistula was debrided and left opening. A total of 30 mL of 1% lidocaine with epinephrine mixed with Bupivacaine 0.25% without epinephrine was injected as a pudendal block. Hemostasis was achieved. Instrument, sponge, and needle counts were all correct as reported to me. Bacitracin was applied, as well as a sterile dressing. The patient tolerated the procedure well.    COMPLICATIONS: none, immediately.    ESTIMATED BLOOD LOSS: 5 mL.    SPECIMEN(S): 5None.    DRAINS/TUBES: 5None.    OPERATIVE FINDINGS: Transsphincteric fistula.       DISPOSITION: PACU.    James Mcdonough MD    Division of Colon & Rectal Surgery  Department of Surgery  [facility]  [phone]        CC:  [facility] Surgery jimenez.  Estefany Benedict NP.

## 2024-05-23 NOTE — DISCHARGE INSTRUCTIONS
"[facility]  Home Care Following Anesthesia  For 24 hours after surgery:  Get plenty of rest.  A responsible adult must stay with you for at least 24 hours after you leave the surgery center.  Do not drive or use heavy equipment.  If you have weakness or tingling, don't drive or use heavy equipment until this feeling goes away.   Do not drink alcohol.   Avoid strenuous or risky activities.  Ask for help when climbing stairs.  You may feel lightheaded.  IF so, sit for a few minutes before standing.  Have someone help you get up.   If you have nausea (feel sick to your stomach): Drink only clear liquids such as apple juice, ginger ale, broth or 7-Up.  Rest may also help.  Be sure to drink enough fluids.  Move to a regular diet as you feel able.   You may have a slight fever.  Call the doctor if your fever is over 100 F (37.7 C) (taken under the tongue) or lasts longer than 24 hours.  You may have a dry mouth, a sore throat, muscle aches or trouble sleeping. These should go away after 24 hours.  Do not make important or legal decisions.   It is recommended to avoid smoking.        Today you received a Marcaine or bupivacaine block to numb the nerves near your surgery site.  This is a block using local anesthetic or \"numbing\" medication injected around the nerves to anesthetize or \"numb\" the area supplied by those nerves.  This block is injected into the muscle layer near your surgical site.  The medication may numb the location where you had surgery for 6-18 hours, but may last up to 24 hours.  If your surgical site is an arm or leg you should be careful with your affected limb, since it is possible to injure your limb without being aware of it due to the numbing.  Until full feeling returns, you should guard against bumping or hitting your limb, and avoid extreme hot or cold temperatures on the skin.  As the block wears off, the feeling will return as a tingling or prickly " sensation near your surgical site.  You will experience more discomfort from your incision as the feeling returns.  You may want to take a pain pill (a narcotic or Tylenol if this was prescribed by your surgeon) when you start to experience mild pain before the pain beccomes more severe.  If your pain medications do not control your pain you should notifiy your surgeon.    Tips for taking pain medications  To get the best pain relief possible, remember these points:  Take pain medications as directed, before pain becomes severe.  Pain medication can upset your stomach: taking it with food may help.  Constipation is a common side effect of pain medication. Drink plenty of  fluids.  Eat foods high in fiber. Take a stool softener if recommended by your doctor or pharmacist.  Do not drink alcohol, drive or operate machinery while taking pain medications.  Ask about other ways to control pain, such as with heat, ice or relaxation.    Tylenol/Acetaminophen Consumption    If you feel your pain relief is insufficient, you may take Tylenol/Acetaminophen in addition to your narcotic pain medication.   Be careful not to exceed 4,000 mg of Tylenol/Acetaminophen in a 24 hour period from all sources.  If you are taking extra strength Tylenol/acetaminophen (500 mg), the maximum dose is 8 tablets in 24 hours.  If you are taking regular strength acetaminophen (325 mg), the maximum dose is 12 tablets in 24 hours.    Call a doctor for any of the following:  Signs of infection (fever, growing tenderness at the surgery site, a large amount of drainage or bleeding, severe pain, foul-smelling drainage, redness, swelling).  It has been over 8 to 10 hours since surgery and you are still not able to urinate (pass water).  Headache for over 24 hours.  Numbness, tingling or weakness the day after surgery (if you had spinal anesthesia).  Signs of Covid-19 infection (temperature over 100 degrees, shortness of breath, cough, loss of taste/smell,  generalized body aches, persistent headache, chills, sore throat, nausea/vomiting/diarrhea)  Your doctor is:  Dr. James Mcdonough, Colon Rectal: [phone]                    Or dial [phone] and ask for the resident on call for:  Colon Rectal  For emergency care, call the:  [facility]:  [phone] (TTY for hearing impaired: [phone])

## 2024-05-23 NOTE — ANESTHESIA PROCEDURE NOTES
Airway       Patient location during procedure: OR       Procedure Start/Stop Times: 5/23/2024 10:36 AM  Staff -        Anesthesiologist:  Soun Alex MD       Performed By: anesthesiologist and other anesthesia staff  Consent for Airway        Urgency: elective  Indications and Patient Condition       Indications for airway management: sondra-procedural         Mask difficulty assessment: 1 - vent by mask    Final Airway Details       Final airway type: endotracheal airway       Successful airway: ETT - single  Endotracheal Airway Details        ETT size (mm): 7.0       Cuffed: yes       Cuff volume (mL): 6       Successful intubation technique: direct laryngoscopy and video laryngoscopy (Glidescope for education purposes)       VL Blade Size: MAC 3       Grade View of Cords: 1       Adjucts: stylet       Position: Right       Measured from: gums/teeth       Secured at (cm): 22       Bite block used: None    Post intubation assessment        Placement verified by: capnometry, equal breath sounds and chest rise        Number of attempts at approach: 1       Secured with: tape       Ease of procedure: easy       Dentition: Unchanged and Intact    Medication(s) Administered   Medication Administration Time: 5/23/2024 10:36 AM    Additional Comments       Glidescope used by Vimal giraldo for educational purposes

## 2024-05-23 NOTE — OP NOTE
Colorectal Surgery Operative Report    Please see Dr. Mcdonough's full operative report. My presence was required due to the complexity of the operation and decreased operative and patient anesthesia time.      Marino Kinney MD  Division of Colon and Rectal Surgery  [facility]  [phone]

## 2024-06-03 ENCOUNTER — MYC MEDICAL ADVICE (OUTPATIENT)
Dept: SURGERY | Facility: CLINIC | Age: 29
End: 2024-06-03

## 2024-06-03 ENCOUNTER — OFFICE VISIT (OUTPATIENT)
Dept: SURGERY | Facility: CLINIC | Age: 29
End: 2024-06-03
Payer: COMMERCIAL

## 2024-06-03 VITALS
WEIGHT: 130.3 LBS | HEART RATE: 62 BPM | BODY MASS INDEX: 21.71 KG/M2 | SYSTOLIC BLOOD PRESSURE: 113 MMHG | TEMPERATURE: 98.7 F | DIASTOLIC BLOOD PRESSURE: 67 MMHG | OXYGEN SATURATION: 98 % | HEIGHT: 65 IN

## 2024-06-03 DIAGNOSIS — Z09 FOLLOW-UP EXAMINATION AFTER COLORECTAL SURGERY: Primary | ICD-10-CM

## 2024-06-03 DIAGNOSIS — K60.329 TRANSSPHINCTERIC ANAL FISTULA: ICD-10-CM

## 2024-06-03 PROCEDURE — 99024 POSTOP FOLLOW-UP VISIT: CPT | Performed by: NURSE PRACTITIONER

## 2024-06-03 ASSESSMENT — PAIN SCALES - GENERAL: PAINLEVEL: NO PAIN (0)

## 2024-06-03 NOTE — NURSING NOTE
"Chief Complaint   Patient presents with    Post-op Visit       Vitals:    06/03/24 1017   BP: 113/67   BP Location: Left arm   Patient Position: Sitting   Cuff Size: Adult Regular   Pulse: 62   Temp: 98.7  F (37.1  C)   TempSrc: Oral   SpO2: 98%   Weight: 130 lb 4.8 oz   Height: 5' 5\"       Body mass index is 21.68 kg/m .    Shelley Nguyen CMA    "

## 2024-06-03 NOTE — LETTER
"6/3/2024       RE: Elena Merrill   [place] [address]       Dear Colleague,    Thank you for referring your patient, Elena Merrill, to the [facility] COLON AND RECTAL SURGERY CLINIC [place] at [facility]. Please see a copy of my visit note below.    Colon and Rectal Surgery Postoperative Clinic Note    RE: Elena Merrill  : 1995  JOSIAH: 6/3/2024    Elena Merrill is a very pleasant 29 year old female with anorectal fistula now status post ligation of intersphincteric fistula tract (LIFT) procedure on 24 by Dr. Mcdonough.     Interval history: Elena has been doing well.  She reports that she has a lot of energy.  She had minimal bleeding initially but this has stopped.  No difficulty with bowel movements and these are very soft on the fiber supplement.  No fevers or chills.    Physical Examination: Exam was chaperoned by Shelley Nguyen MA   /67 (BP Location: Left arm, Patient Position: Sitting, Cuff Size: Adult Regular)   Pulse 62   Temp 98.7  F (37.1  C) (Oral)   Ht 5' 5\"   Wt 130 lb 4.8 oz   LMP 05/10/2024 (Approximate)   SpO2 98%   BMI 21.68 kg/m    General: Alert, oriented, in no acute distress, sitting comfortably  HEENT: Mucous membranes moist    Perianal external examination:  Both surgical sites in the left anterior position with good granulation tissue and sutures present.  No erythema and no purulent drainage.    Digital rectal examination: Was deferred.    Anoscopy: Was deferred.    Assessment/Plan:  29 year old female with anorectal fistula now status post ligation of intersphincteric fistula tract (LIFT) procedure on 24 by Dr. Mcdonough.  She is healing well.  Surgical sites without signs of infection.  No difficulty with bowel movements.  Continue on daily fiber supplement and avoid any constipation or straining.  Avoid any activities that cause direct trauma or stress of the surgical sites. "  She is planning to get pregnant and I advised no intercourse for 8 weeks and then discuss  with her OB. Patient's questions were answered to her stated satisfaction and she is in agreement with this plan.     Medical history:  Past Medical History:   Diagnosis Date    Uncomplicated asthma     It was when I was younger.       Surgical history:  Past Surgical History:   Procedure Laterality Date    FISTULOTOMY RECTUM N/A 2023    Procedure: Exam under anesthesia and SETON PLACEMENT;  Surgeon: James Mcdonough MD;  Location: [facility] OR    LIGATION, FISTULA TRACT, INTERSPHINCTERIC N/A 2024    Procedure: LIGATION, FISTULA TRACT, INTERSPHINCTERIC;  Surgeon: James Mcdonough MD;  Location: [facility] OR       Problem list:    Patient Active Problem List    Diagnosis Date Noted    Anal fistula 10/23/2023     Priority: Medium       Medications:  Current Outpatient Medications   Medication Sig Dispense Refill    traZODone (DESYREL) 100 MG tablet Take  mg by mouth      ciprofloxacin (CIPRO) 500 MG tablet Take 1 tablet (500 mg) by mouth 2 times daily 14 tablet 0    metroNIDAZOLE (FLAGYL) 250 MG tablet Take 1 tablet (250 mg) by mouth 3 times daily 15 tablet 0       Allergies:  No Known Allergies    Family history:  Family History   Problem Relation Age of Onset    Breast Cancer Other         Maternal Aunt    Breast Cancer Other         Maternal Aunt    Colon Cancer Other         Maternal Aunt    Asthma Brother     Coronary Artery Disease Father     Hypertension Father        Social history:  Social History     Tobacco Use    Smoking status: Never     Passive exposure: Never    Smokeless tobacco: Never   Substance Use Topics    Alcohol use: Yes     Comment: occasional     Marital status: .    Nursing Notes:   Shelley Nguyen  6/3/2024 10:19 AM  Signed  Chief Complaint   Patient presents with    Post-op Visit       Vitals:    24 1017   BP: 113/67   BP Location: Left arm   Patient Position: Sitting  "  Cuff Size: Adult Regular   Pulse: 62   Temp: 98.7  F (37.1  C)   TempSrc: Oral   SpO2: 98%   Weight: 130 lb 4.8 oz   Height: 5' 5\"     Body mass index is 21.68 kg/m .    Shelley Nguyen CMA    15 minutes spent on the date of the encounter doing chart review, history and exam, documentation and further activities as noted above.   This is a postop visit.      This note was created using speech recognition software and may contain unintended word substitutions.      Again, thank you for allowing me to participate in the care of your patient.      Sincerely,    SULEIMAN Daniels CNP    "

## 2024-06-03 NOTE — PROGRESS NOTES
"Colon and Rectal Surgery Postoperative Clinic Note    RE: Elena Merrill  : 1995  JOSIAH: 6/3/2024    Elena Merrill is a very pleasant 29 year old female with anorectal fistula now status post ligation of intersphincteric fistula tract (LIFT) procedure on 24 by Dr. Mcdonough.     Interval history: Elena has been doing well.  She reports that she has a lot of energy.  She had minimal bleeding initially but this has stopped.  No difficulty with bowel movements and these are very soft on the fiber supplement.  No fevers or chills.    Physical Examination: Exam was chaperoned by Shelley Nguyen MA   /67 (BP Location: Left arm, Patient Position: Sitting, Cuff Size: Adult Regular)   Pulse 62   Temp 98.7  F (37.1  C) (Oral)   Ht 5' 5\"   Wt 130 lb 4.8 oz   LMP 05/10/2024 (Approximate)   SpO2 98%   BMI 21.68 kg/m    General: Alert, oriented, in no acute distress, sitting comfortably  HEENT: Mucous membranes moist    Perianal external examination:  Both surgical sites in the left anterior position with good granulation tissue and sutures present.  No erythema and no purulent drainage.    Digital rectal examination: Was deferred.    Anoscopy: Was deferred.    Assessment/Plan:  29 year old female with anorectal fistula now status post ligation of intersphincteric fistula tract (LIFT) procedure on 24 by Dr. Mcdonough.  She is healing well.  Surgical sites without signs of infection.  No difficulty with bowel movements.  Continue on daily fiber supplement and avoid any constipation or straining.  Avoid any activities that cause direct trauma or stress of the surgical sites.  She is planning to get pregnant and I advised no intercourse for 8 weeks and then discuss  with her OB. Patient's questions were answered to her stated satisfaction and she is in agreement with this plan.     Medical history:  Past Medical History:   Diagnosis Date    Uncomplicated asthma     It was when I was " "younger.       Surgical history:  Past Surgical History:   Procedure Laterality Date    FISTULOTOMY RECTUM N/A 12/6/2023    Procedure: Exam under anesthesia and SETON PLACEMENT;  Surgeon: James Mcdonough MD;  Location: [facility] OR    LIGATION, FISTULA TRACT, INTERSPHINCTERIC N/A 5/23/2024    Procedure: LIGATION, FISTULA TRACT, INTERSPHINCTERIC;  Surgeon: James Mcdonough MD;  Location: [facility] OR       Problem list:    Patient Active Problem List    Diagnosis Date Noted    Anal fistula 10/23/2023     Priority: Medium       Medications:  Current Outpatient Medications   Medication Sig Dispense Refill    traZODone (DESYREL) 100 MG tablet Take  mg by mouth      ciprofloxacin (CIPRO) 500 MG tablet Take 1 tablet (500 mg) by mouth 2 times daily 14 tablet 0    metroNIDAZOLE (FLAGYL) 250 MG tablet Take 1 tablet (250 mg) by mouth 3 times daily 15 tablet 0       Allergies:  No Known Allergies    Family history:  Family History   Problem Relation Age of Onset    Breast Cancer Other         Maternal Aunt    Breast Cancer Other         Maternal Aunt    Colon Cancer Other         Maternal Aunt    Asthma Brother     Coronary Artery Disease Father     Hypertension Father        Social history:  Social History     Tobacco Use    Smoking status: Never     Passive exposure: Never    Smokeless tobacco: Never   Substance Use Topics    Alcohol use: Yes     Comment: occasional     Marital status: .    Nursing Notes:   Shelley Nguyen  6/3/2024 10:19 AM  Signed  Chief Complaint   Patient presents with    Post-op Visit       Vitals:    06/03/24 1017   BP: 113/67   BP Location: Left arm   Patient Position: Sitting   Cuff Size: Adult Regular   Pulse: 62   Temp: 98.7  F (37.1  C)   TempSrc: Oral   SpO2: 98%   Weight: 130 lb 4.8 oz   Height: 5' 5\"       Body mass index is 21.68 kg/m .    Shelley Nguyen CMA       15 minutes spent on the date of the encounter doing chart review, history and exam, documentation and further activities as " noted above.   This is a postop visit.    SULEIMAN Jose, NP-C  Colon and Rectal Surgery  [facility]    This note was created using speech recognition software and may contain unintended word substitutions.

## 2024-06-10 NOTE — PROGRESS NOTES
Colon and Rectal Surgery Clinic Note    RE: Elena Merrill.  : 1995.  JOSIAH: 2024.    Reason for visit: post op.    HPI:  Elena Merrill is a 29 year old female who presents today for an anal fistula. She has a past medical history of asthma. I took her to the OR on 2024 for a seton placement. MRI pelvis on 2024 demonstrated a transsphincteric left perianal fistula. She is now s/p LIFT on 2024. She met with Estefany Benedict NP on 6/3/2024 and was healing well at the time. She called in with increased surgical site pain.     Interval History: Doing OK, some bloody drainage <1/4 tbs .    Medications:  Current Outpatient Medications   Medication Sig Dispense Refill    metroNIDAZOLE (FLAGYL) 500 MG tablet Take 1 tablet (500 mg) by mouth 3 times daily for 7 days 21 tablet 0    traZODone (DESYREL) 100 MG tablet Take  mg by mouth      ciprofloxacin (CIPRO) 500 MG tablet Take 1 tablet (500 mg) by mouth 2 times daily 14 tablet 0    metroNIDAZOLE (FLAGYL) 250 MG tablet Take 1 tablet (250 mg) by mouth 3 times daily 15 tablet 0       Allergies:  No Known Allergies    Social history:   Social History     Tobacco Use    Smoking status: Never     Passive exposure: Never    Smokeless tobacco: Never   Substance Use Topics    Alcohol use: Yes     Comment: occasional     Marital status: .    ROS:  A complete review of systems was performed with the patient and all systems negative except as per HPI.    Physical Examination:  Exam was chaperoned by BANDAR Hayden-P  /81 (BP Location: Left arm, Patient Position: Sitting, Cuff Size: Adult Regular)   Pulse 81   LMP 05/10/2024 (Approximate)   SpO2 100%   General: Well hydrated. No acute distress.  Abdomen: Soft, NT. No inguinal adenopathy palpated.  Perianal external examination: Minimal granulation tissue at the intersphincteric incision, silver nitrate was applied. External tract opening was closed.    ASSESSMENT    This is a  29 year old F with a transsphincteric anal fistula s/p LIFT on 5/23/2024. She is progressing well.    All pertinent labs and imaging were personally reviewed by me.    PLAN  - 1 week of abx  - RTC in 2 weeks with Estefany Benedict NP    20 minutes spent on the date of the encounter doing chart review, history and exam, imaging review, documentation and further activities as noted above.      James Mcdonough MD    Division of Colon and Rectal Surgery  [facility]    Referring Provider:  No referring provider defined for this encounter.     Primary Care Provider:  Denisa Esposito

## 2024-06-11 ENCOUNTER — OFFICE VISIT (OUTPATIENT)
Dept: SURGERY | Facility: CLINIC | Age: 29
End: 2024-06-11
Payer: COMMERCIAL

## 2024-06-11 VITALS — DIASTOLIC BLOOD PRESSURE: 81 MMHG | OXYGEN SATURATION: 100 % | HEART RATE: 81 BPM | SYSTOLIC BLOOD PRESSURE: 129 MMHG

## 2024-06-11 DIAGNOSIS — K60.329 TRANSSPHINCTERIC ANAL FISTULA: Primary | ICD-10-CM

## 2024-06-11 PROCEDURE — 99024 POSTOP FOLLOW-UP VISIT: CPT | Performed by: SURGERY

## 2024-06-11 RX ORDER — METRONIDAZOLE 500 MG/1
500 TABLET ORAL 3 TIMES DAILY
Qty: 21 TABLET | Refills: 0 | Status: SHIPPED | OUTPATIENT
Start: 2024-06-11 | End: 2024-06-18

## 2024-06-11 ASSESSMENT — PAIN SCALES - GENERAL: PAINLEVEL: MILD PAIN (2)

## 2024-06-11 NOTE — NURSING NOTE
Chief Complaint   Patient presents with    Post-op Visit       Vitals:    06/11/24 0805   BP: 129/81   BP Location: Left arm   Patient Position: Sitting   Cuff Size: Adult Regular   Pulse: 81   SpO2: 100%       There is no height or weight on file to calculate BMI.    Michael Bonner EMT-P

## 2024-06-11 NOTE — LETTER
2024       RE: Elena Merrill   [place] [address]     Dear Colleague,    Thank you for referring your patient, Elena Merrill, to the [facility] COLON AND RECTAL SURGERY CLINIC [place] at [facility]. Please see a copy of my visit note below.    Colon and Rectal Surgery Clinic Note    RE: Elena Merrill.  : 1995.  JOSIAH: 2024.    Reason for visit: post op.    HPI:  Elena Merrill is a 29 year old female who presents today for an anal fistula. She has a past medical history of asthma. I took her to the OR on 2024 for a seton placement. MRI pelvis on 2024 demonstrated a transsphincteric left perianal fistula. She is now s/p LIFT on 2024. She met with Estefany Benedict NP on 6/3/2024 and was healing well at the time. She called in with increased surgical site pain.     Interval History: Doing OK, some bloody drainage <1/4 tbs .    Medications:  Current Outpatient Medications   Medication Sig Dispense Refill    metroNIDAZOLE (FLAGYL) 500 MG tablet Take 1 tablet (500 mg) by mouth 3 times daily for 7 days 21 tablet 0    traZODone (DESYREL) 100 MG tablet Take  mg by mouth      ciprofloxacin (CIPRO) 500 MG tablet Take 1 tablet (500 mg) by mouth 2 times daily 14 tablet 0    metroNIDAZOLE (FLAGYL) 250 MG tablet Take 1 tablet (250 mg) by mouth 3 times daily 15 tablet 0       Allergies:  No Known Allergies    Social history:   Social History     Tobacco Use    Smoking status: Never     Passive exposure: Never    Smokeless tobacco: Never   Substance Use Topics    Alcohol use: Yes     Comment: occasional     Marital status: .    ROS:  A complete review of systems was performed with the patient and all systems negative except as per HPI.    Physical Examination:  Exam was chaperoned by BANDAR Hayden-P  /81 (BP Location: Left arm, Patient Position: Sitting, Cuff Size: Adult Regular)   Pulse  81   LMP 05/10/2024 (Approximate)   SpO2 100%   General: Well hydrated. No acute distress.  Abdomen: Soft, NT. No inguinal adenopathy palpated.  Perianal external examination: Minimal granulation tissue at the intersphincteric incision, silver nitrate was applied. External tract opening was closed.    ASSESSMENT    This is a 29 year old F with a transsphincteric anal fistula s/p LIFT on 5/23/2024. She is progressing well.    All pertinent labs and imaging were personally reviewed by me.    PLAN  - 1 week of abx  - RTC in 2 weeks with Estefany Benedict NP    20 minutes spent on the date of the encounter doing chart review, history and exam, imaging review, documentation and further activities as noted above.    Referring Provider:  No referring provider defined for this encounter.     Primary Care Provider:  Denisa Esposito      Again, thank you for allowing me to participate in the care of your patient.      Sincerely,    James Mcdonough MD

## 2024-06-11 NOTE — PATIENT INSTRUCTIONS
Follow up:  Flagyl x7 days  Follow up with Estefany Benedict NP in 2 weeks.      Please call with any questions or concerns regarding your clinic visit today.    It is a pleasure being involved in your health care.    Contacts post-consultation depending on your need:    Radiology Appointments [phone]    Schedule Clinic Appointments [phone] # 1   M-F 7:30 - 5 pm    [facility] [phone]    Clinic Fax Number [phone]    Surgery Scheduling [phone]    My Chart is available 24 hours a day and is a secure way to access your records and communicate with your care team.  I strongly recommend signing up if you haven't already done so, if you are comfortable with computers.  If you would like to inquire about this or are having problems with My Chart access, you may call [phone] or go online at [email or web address].  Please allow at least 24 hours for a response and extra time on weekends and Holidays.

## 2024-06-24 ENCOUNTER — OFFICE VISIT (OUTPATIENT)
Dept: SURGERY | Facility: CLINIC | Age: 29
End: 2024-06-24
Payer: COMMERCIAL

## 2024-06-24 VITALS
OXYGEN SATURATION: 98 % | WEIGHT: 130.4 LBS | SYSTOLIC BLOOD PRESSURE: 108 MMHG | DIASTOLIC BLOOD PRESSURE: 72 MMHG | HEART RATE: 84 BPM | BODY MASS INDEX: 21.73 KG/M2 | HEIGHT: 65 IN

## 2024-06-24 DIAGNOSIS — Z09 FOLLOW-UP EXAMINATION AFTER COLORECTAL SURGERY: Primary | ICD-10-CM

## 2024-06-24 DIAGNOSIS — K60.329 TRANSSPHINCTERIC ANAL FISTULA: ICD-10-CM

## 2024-06-24 PROCEDURE — 99024 POSTOP FOLLOW-UP VISIT: CPT | Performed by: NURSE PRACTITIONER

## 2024-06-24 ASSESSMENT — PAIN SCALES - GENERAL: PAINLEVEL: NO PAIN (1)

## 2024-06-24 NOTE — PROGRESS NOTES
"Colon and Rectal Surgery Postoperative Clinic Note    RE: Elena Merrill  : 1995  JOSIAH: 2024    Elena Merrill is a very pleasant 29 year old female with anorectal fistula now status post ligation of intersphincteric fistula tract (LIFT) procedure on 24 by Dr. Mcdonough.     Interval history: Elena has been doing well. Some discomfort and mild blood in the morning but pain is a lot better after antibiotics. Minimal drainage. No fevers or chills. Stools are soft. No difficulty holding bowel movements.    Physical Examination: Exam was chaperoned by Shelley Nguyen MA   /72 (BP Location: Left arm, Patient Position: Sitting, Cuff Size: Adult Regular)   Pulse 84   Ht 5' 5\"   Wt 130 lb 6.4 oz   LMP 05/10/2024 (Approximate)   SpO2 98%   BMI 21.70 kg/m    General: Alert, oriented, in no acute distress, sitting comfortably  HEENT: Mucous membranes moist    Perianal external examination:  External site well healed. Internal site in the left anterior position superficial with some sutures still present. No hypergranulation tissue present.    Digital rectal examination: Was deferred.    Anoscopy: Was deferred.    Assessment/Plan:  29 year old female with anorectal fistula now status post ligation of intersphincteric fistula tract (LIFT) procedure on 24 by Dr. Mcdonough.  She is healing well.  Surgical sites without signs of infection.  No difficulty with bowel movements.  Continue on daily fiber supplement and avoid any constipation or straining.  Avoid any activities that cause direct trauma or stress of the surgical sites for 8 weeks from surgery. Follow up in 2-3 weeks for recheck. Patient's questions were answered to her stated satisfaction and she is in agreement with this plan.     Medical history:  Past Medical History:   Diagnosis Date    Uncomplicated asthma     It was when I was younger.       Surgical history:  Past Surgical History:   Procedure Laterality Date    FISTULOTOMY " "RECTUM N/A 12/6/2023    Procedure: Exam under anesthesia and SETON PLACEMENT;  Surgeon: James Mcdonough MD;  Location: [facility] OR    LIGATION, FISTULA TRACT, INTERSPHINCTERIC N/A 5/23/2024    Procedure: LIGATION, FISTULA TRACT, INTERSPHINCTERIC;  Surgeon: James Mcdonough MD;  Location: [facility] OR       Problem list:    Patient Active Problem List    Diagnosis Date Noted    Anal fistula 10/23/2023     Priority: Medium       Medications:  Current Outpatient Medications   Medication Sig Dispense Refill    traZODone (DESYREL) 100 MG tablet Take  mg by mouth      ciprofloxacin (CIPRO) 500 MG tablet Take 1 tablet (500 mg) by mouth 2 times daily 14 tablet 0    metroNIDAZOLE (FLAGYL) 250 MG tablet Take 1 tablet (250 mg) by mouth 3 times daily 15 tablet 0       Allergies:  No Known Allergies    Family history:  Family History   Problem Relation Age of Onset    Breast Cancer Other         Maternal Aunt    Breast Cancer Other         Maternal Aunt    Colon Cancer Other         Maternal Aunt    Asthma Brother     Coronary Artery Disease Father     Hypertension Father        Social history:  Social History     Tobacco Use    Smoking status: Never     Passive exposure: Never    Smokeless tobacco: Never   Substance Use Topics    Alcohol use: Yes     Comment: occasional     Marital status: .    Nursing Notes:   Freddy Spivey  6/24/2024  2:08 PM  Signed  Chief Complaint   Patient presents with    Surgical Followup     Post-op, DOS 5/23/24.       Vitals:    06/24/24 1405   BP: 108/72   BP Location: Left arm   Patient Position: Sitting   Cuff Size: Adult Regular   Pulse: 84   SpO2: 98%   Weight: 130 lb 6.4 oz   Height: 5' 5\"       Body mass index is 21.7 kg/m .    Patient reports mild rectal pain (1/10).    BANDAR Camacho       10 minutes spent on the date of the encounter doing chart review, history and exam, documentation and further activities as noted above.   This is a postop visit.    Estefany Benedict, " APRN, NP-C  Colon and Rectal Surgery  [facility]    This note was created using speech recognition software and may contain unintended word substitutions.

## 2024-06-24 NOTE — NURSING NOTE
"Chief Complaint   Patient presents with    Surgical Followup     Post-op, DOS 5/23/24.       Vitals:    06/24/24 1405   BP: 108/72   BP Location: Left arm   Patient Position: Sitting   Cuff Size: Adult Regular   Pulse: 84   SpO2: 98%   Weight: 130 lb 6.4 oz   Height: 5' 5\"       Body mass index is 21.7 kg/m .    Patient reports mild rectal pain (1/10).    Freddy Spivey, EMT    "

## 2024-06-24 NOTE — LETTER
"2024       RE: Elena Merrill   [place] [address]     Dear Colleague,    Thank you for referring your patient, Elena Merrill, to the [facility] COLON AND RECTAL SURGERY CLINIC [place] at [facility]. Please see a copy of my visit note below.    Colon and Rectal Surgery Postoperative Clinic Note    RE: Elena Merrill  : 1995  JOSIAH: 2024    Elena Merrill is a very pleasant 29 year old female with anorectal fistula now status post ligation of intersphincteric fistula tract (LIFT) procedure on 24 by Dr. Mcdonough.     Interval history: Elena has been doing well. Some discomfort and mild blood in the morning but pain is a lot better after antibiotics. Minimal drainage. No fevers or chills. Stools are soft. No difficulty holding bowel movements.    Physical Examination: Exam was chaperoned by Shelley Nguyen MA   /72 (BP Location: Left arm, Patient Position: Sitting, Cuff Size: Adult Regular)   Pulse 84   Ht 5' 5\"   Wt 130 lb 6.4 oz   LMP 05/10/2024 (Approximate)   SpO2 98%   BMI 21.70 kg/m    General: Alert, oriented, in no acute distress, sitting comfortably  HEENT: Mucous membranes moist    Perianal external examination:  External site well healed. Internal site in the left anterior position superficial with some sutures still present. No hypergranulation tissue present.    Digital rectal examination: Was deferred.    Anoscopy: Was deferred.    Assessment/Plan:  29 year old female with anorectal fistula now status post ligation of intersphincteric fistula tract (LIFT) procedure on 24 by Dr. Mcdonough.  She is healing well.  Surgical sites without signs of infection.  No difficulty with bowel movements.  Continue on daily fiber supplement and avoid any constipation or straining.  Avoid any activities that cause direct trauma or stress of the surgical sites for 8 weeks from surgery. Follow up in 2-3 " weeks for recheck. Patient's questions were answered to her stated satisfaction and she is in agreement with this plan.     Medical history:  Past Medical History:   Diagnosis Date    Uncomplicated asthma     It was when I was younger.       Surgical history:  Past Surgical History:   Procedure Laterality Date    FISTULOTOMY RECTUM N/A 12/6/2023    Procedure: Exam under anesthesia and SETON PLACEMENT;  Surgeon: James Mcdonough MD;  Location: [facility] OR    LIGATION, FISTULA TRACT, INTERSPHINCTERIC N/A 5/23/2024    Procedure: LIGATION, FISTULA TRACT, INTERSPHINCTERIC;  Surgeon: James Mcdonough MD;  Location: [facility] OR       Problem list:    Patient Active Problem List    Diagnosis Date Noted    Anal fistula 10/23/2023     Priority: Medium       Medications:  Current Outpatient Medications   Medication Sig Dispense Refill    traZODone (DESYREL) 100 MG tablet Take  mg by mouth      ciprofloxacin (CIPRO) 500 MG tablet Take 1 tablet (500 mg) by mouth 2 times daily 14 tablet 0    metroNIDAZOLE (FLAGYL) 250 MG tablet Take 1 tablet (250 mg) by mouth 3 times daily 15 tablet 0       Allergies:  No Known Allergies    Family history:  Family History   Problem Relation Age of Onset    Breast Cancer Other         Maternal Aunt    Breast Cancer Other         Maternal Aunt    Colon Cancer Other         Maternal Aunt    Asthma Brother     Coronary Artery Disease Father     Hypertension Father        Social history:  Social History     Tobacco Use    Smoking status: Never     Passive exposure: Never    Smokeless tobacco: Never   Substance Use Topics    Alcohol use: Yes     Comment: occasional     Marital status: .    Nursing Notes:   Freddy Spivey  6/24/2024  2:08 PM  Signed  Chief Complaint   Patient presents with    Surgical Followup     Post-op, DOS 5/23/24.       Vitals:    06/24/24 1405   BP: 108/72   BP Location: Left arm   Patient Position: Sitting   Cuff Size: Adult Regular   Pulse: 84   SpO2: 98%   Weight: 130 lb  "6.4 oz   Height: 5' 5\"       Body mass index is 21.7 kg/m .    Patient reports mild rectal pain (1/10).    Freddy Spivey EMT       10 minutes spent on the date of the encounter doing chart review, history and exam, documentation and further activities as noted above.   This is a postop visit.        This note was created using speech recognition software and may contain unintended word substitutions.      Again, thank you for allowing me to participate in the care of your patient.      Sincerely,    SULEIMAN Daniels CNP    "

## 2024-07-09 ENCOUNTER — OFFICE VISIT (OUTPATIENT)
Dept: SURGERY | Facility: CLINIC | Age: 29
End: 2024-07-09
Payer: COMMERCIAL

## 2024-07-09 VITALS
SYSTOLIC BLOOD PRESSURE: 115 MMHG | BODY MASS INDEX: 21.81 KG/M2 | DIASTOLIC BLOOD PRESSURE: 76 MMHG | HEART RATE: 69 BPM | WEIGHT: 130.9 LBS | OXYGEN SATURATION: 98 % | HEIGHT: 65 IN

## 2024-07-09 DIAGNOSIS — K60.329 TRANSSPHINCTERIC ANAL FISTULA: ICD-10-CM

## 2024-07-09 DIAGNOSIS — Z09 FOLLOW-UP EXAMINATION AFTER COLORECTAL SURGERY: Primary | ICD-10-CM

## 2024-07-09 PROCEDURE — 99024 POSTOP FOLLOW-UP VISIT: CPT | Performed by: NURSE PRACTITIONER

## 2024-07-09 ASSESSMENT — PAIN SCALES - GENERAL: PAINLEVEL: NO PAIN (0)

## 2024-07-09 NOTE — PROGRESS NOTES
"Colon and Rectal Surgery Postoperative Clinic Note    RE: Elena Merrill  : 1995  JOSIAH: 2024    Elena Merrill is a very pleasant 29 year old female with anorectal fistula now status post ligation of intersphincteric fistula tract (LIFT) procedure on 24 by Dr. Mcdonough.     Interval history: Elena has been doing well. No pain but some minor bleeding when wiping after bowel movements. Stools are soft. No difficulty     Physical Examination: Exam was chaperoned by Shelley Nguyen MA   /76 (BP Location: Left arm, Patient Position: Sitting, Cuff Size: Adult Regular)   Pulse 69   Ht 5' 5\"   Wt 130 lb 14.4 oz   LMP 05/10/2024 (Approximate)   SpO2 98%   BMI 21.78 kg/m    General: Alert, oriented, in no acute distress, sitting comfortably  HEENT: Mucous membranes moist    Perianal external examination:  External site well healed. Internal site in the left anterior position with a small amount of hypergranulation tissue and bleeding present. Silver nitrate applied.    Digital rectal examination: Was deferred.    Anoscopy: Was deferred.    Assessment/Plan:  29 year old female with anorectal fistula now status post ligation of intersphincteric fistula tract (LIFT) procedure on 24 by Dr. Mcdonough.  She is healing well but with a small amount of bleeding after bowel movements. Silver nitrate applied to a small area of hypergranulation tissue. Follow up after 3 weeks for recheck or anytime in the meantime with any questions or concerns. No intercourse for 8 weeks and then discuss  with her OB. Patient's questions were answered to her stated satisfaction and she is in agreement with this plan.     Medical history:  Past Medical History:   Diagnosis Date    Uncomplicated asthma     It was when I was younger.       Surgical history:  Past Surgical History:   Procedure Laterality Date    FISTULOTOMY RECTUM N/A 2023    Procedure: Exam under anesthesia and SETON PLACEMENT;  Surgeon: " "James Mcdonough MD;  Location: [facility] OR    LIGATION, FISTULA TRACT, INTERSPHINCTERIC N/A 5/23/2024    Procedure: LIGATION, FISTULA TRACT, INTERSPHINCTERIC;  Surgeon: James Mcdonough MD;  Location: [facility] OR       Problem list:    Patient Active Problem List    Diagnosis Date Noted    Anal fistula 10/23/2023     Priority: Medium       Medications:  Current Outpatient Medications   Medication Sig Dispense Refill    traZODone (DESYREL) 100 MG tablet Take  mg by mouth      ciprofloxacin (CIPRO) 500 MG tablet Take 1 tablet (500 mg) by mouth 2 times daily 14 tablet 0    metroNIDAZOLE (FLAGYL) 250 MG tablet Take 1 tablet (250 mg) by mouth 3 times daily 15 tablet 0       Allergies:  No Known Allergies    Family history:  Family History   Problem Relation Age of Onset    Breast Cancer Other         Maternal Aunt    Breast Cancer Other         Maternal Aunt    Colon Cancer Other         Maternal Aunt    Asthma Brother     Coronary Artery Disease Father     Hypertension Father        Social history:  Social History     Tobacco Use    Smoking status: Never     Passive exposure: Never    Smokeless tobacco: Never   Substance Use Topics    Alcohol use: Yes     Comment: occasional     Marital status: .    Nursing Notes:   Sheri Rodas  7/9/2024 11:00 AM  Signed  Chief Complaint   Patient presents with    Post-op Visit       Vitals:    07/09/24 1057   BP: 115/76   BP Location: Left arm   Patient Position: Sitting   Cuff Size: Adult Regular   Pulse: 69   SpO2: 98%   Weight: 130 lb 14.4 oz   Height: 5' 5\"       Body mass index is 21.78 kg/m .    BANDAR Duffy     10 minutes spent on the date of the encounter doing chart review, history and exam, documentation and further activities as noted above.   This is a postop visit.    SULEIMAN Jose, NP-C  Colon and Rectal Surgery  [facility]    This note was created using speech recognition software and may contain unintended word " substitutions.

## 2024-07-09 NOTE — NURSING NOTE
"Chief Complaint   Patient presents with    Post-op Visit       Vitals:    07/09/24 1057   BP: 115/76   BP Location: Left arm   Patient Position: Sitting   Cuff Size: Adult Regular   Pulse: 69   SpO2: 98%   Weight: 130 lb 14.4 oz   Height: 5' 5\"       Body mass index is 21.78 kg/m .    Sheri Rodas, EMT  "

## 2024-07-09 NOTE — LETTER
"2024     RE: Elena Merrill   [place] [address]     Dear Colleague,  Thank you for referring your patient, Elena Merrill, to the [facility] COLON AND RECTAL SURGERY CLINIC [place] at [facility]. Please see a copy of my visit note below.    Colon and Rectal Surgery Postoperative Clinic Note    RE: Elena Merrill  : 1995  JOSIAH: 2024    Elena Merrill is a very pleasant 29 year old female with anorectal fistula now status post ligation of intersphincteric fistula tract (LIFT) procedure on 24 by Dr. Mcdonough.     Interval history: Elena has been doing well. No pain but some minor bleeding when wiping after bowel movements. Stools are soft. No difficulty     Physical Examination: Exam was chaperoned by Shelley Nguyen MA   /76 (BP Location: Left arm, Patient Position: Sitting, Cuff Size: Adult Regular)   Pulse 69   Ht 5' 5\"   Wt 130 lb 14.4 oz   LMP 05/10/2024 (Approximate)   SpO2 98%   BMI 21.78 kg/m    General: Alert, oriented, in no acute distress, sitting comfortably  HEENT: Mucous membranes moist    Perianal external examination:  External site well healed. Internal site in the left anterior position with a small amount of hypergranulation tissue and bleeding present. Silver nitrate applied.    Digital rectal examination: Was deferred.    Anoscopy: Was deferred.    Assessment/Plan:  29 year old female with anorectal fistula now status post ligation of intersphincteric fistula tract (LIFT) procedure on 24 by Dr. Mcdonough.  She is healing well but with a small amount of bleeding after bowel movements. Silver nitrate applied to a small area of hypergranulation tissue. Follow up after 3 weeks for recheck or anytime in the meantime with any questions or concerns. No intercourse for 8 weeks and then discuss  with her OB. Patient's questions were answered to her stated satisfaction and she is " "in agreement with this plan.     Medical history:  Past Medical History:   Diagnosis Date     Uncomplicated asthma     It was when I was younger.       Surgical history:  Past Surgical History:   Procedure Laterality Date     FISTULOTOMY RECTUM N/A 12/6/2023    Procedure: Exam under anesthesia and SETON PLACEMENT;  Surgeon: James Mcdonough MD;  Location: [facility] OR     LIGATION, FISTULA TRACT, INTERSPHINCTERIC N/A 5/23/2024    Procedure: LIGATION, FISTULA TRACT, INTERSPHINCTERIC;  Surgeon: James Mcdonough MD;  Location: [facility] OR       Problem list:    Patient Active Problem List    Diagnosis Date Noted     Anal fistula 10/23/2023     Priority: Medium       Medications:  Current Outpatient Medications   Medication Sig Dispense Refill     traZODone (DESYREL) 100 MG tablet Take  mg by mouth       ciprofloxacin (CIPRO) 500 MG tablet Take 1 tablet (500 mg) by mouth 2 times daily 14 tablet 0     metroNIDAZOLE (FLAGYL) 250 MG tablet Take 1 tablet (250 mg) by mouth 3 times daily 15 tablet 0       Allergies:  No Known Allergies    Family history:  Family History   Problem Relation Age of Onset     Breast Cancer Other         Maternal Aunt     Breast Cancer Other         Maternal Aunt     Colon Cancer Other         Maternal Aunt     Asthma Brother      Coronary Artery Disease Father      Hypertension Father        Social history:  Social History     Tobacco Use     Smoking status: Never     Passive exposure: Never     Smokeless tobacco: Never   Substance Use Topics     Alcohol use: Yes     Comment: occasional     Marital status: .    Nursing Notes:   Sheri Rodas  7/9/2024 11:00 AM  Signed  Chief Complaint   Patient presents with     Post-op Visit       Vitals:    07/09/24 1057   BP: 115/76   BP Location: Left arm   Patient Position: Sitting   Cuff Size: Adult Regular   Pulse: 69   SpO2: 98%   Weight: 130 lb 14.4 oz   Height: 5' 5\"       Body mass index is 21.78 kg/m .    Sheri Rodas, EMT     10 minutes spent on the " date of the encounter doing chart review, history and exam, documentation and further activities as noted above.   This is a postop visit.    SULEIMAN Jose, NP-C  Colon and Rectal Surgery  [facility]    This note was created using speech recognition software and may contain unintended word substitutions.

## 2024-07-30 NOTE — PROGRESS NOTES
"Colon and Rectal Surgery Postoperative Clinic Note    RE: Elena Merrill  : 1995  JOSIAH: 2024    Elena Merrill is a very pleasant 29 year old female with anorectal fistula now status post ligation of intersphincteric fistula tract (LIFT) procedure on 24 by Dr. Mcdonough.     Interval history: Elena has been doing well. No pain but some minor bleeding when wiping after bowel movements.    Physical Examination: Exam was chaperoned by Sheri Rodas EMT   /75 (BP Location: Left arm, Patient Position: Sitting, Cuff Size: Adult Regular)   Pulse 70   Ht 5' 5\"   Wt 128 lb 14.4 oz   SpO2 99%   BMI 21.45 kg/m    General: Alert, oriented, in no acute distress, sitting comfortably  HEENT: Mucous membranes moist    Perianal external examination:  External site well healed. Internal site in the left anterior position with a small amount of hypergranulation tissue and purulent drainage.     Digital rectal examination: Was deferred.    Anoscopy: Was deferred.    Assessment/Plan:  29 year old female with anorectal fistula now status post ligation of intersphincteric fistula tract (LIFT) procedure on 24 by Dr. Mcdonough. I am concerned about a recurrent fistula at the internal site. Discussed with Dr. Mcdonough who would like to see her in clinic for exam and discuss possible EUA. Patient's questions were answered to her stated satisfaction and she is in agreement with this plan.     Medical history:  Past Medical History:   Diagnosis Date    Uncomplicated asthma     It was when I was younger.       Surgical history:  Past Surgical History:   Procedure Laterality Date    FISTULOTOMY RECTUM N/A 2023    Procedure: Exam under anesthesia and SETON PLACEMENT;  Surgeon: James Mcdonough MD;  Location: [facility] OR    LIGATION, FISTULA TRACT, INTERSPHINCTERIC N/A 2024    Procedure: LIGATION, FISTULA TRACT, INTERSPHINCTERIC;  Surgeon: James Mcdonough MD;  Location: [facility] OR       Problem list:    Patient " "Active Problem List    Diagnosis Date Noted    Anal fistula 10/23/2023     Priority: Medium       Medications:  Current Outpatient Medications   Medication Sig Dispense Refill    ciprofloxacin (CIPRO) 500 MG tablet Take 1 tablet (500 mg) by mouth 2 times daily 14 tablet 0    metroNIDAZOLE (FLAGYL) 250 MG tablet Take 1 tablet (250 mg) by mouth 3 times daily 15 tablet 0    traZODone (DESYREL) 100 MG tablet Take  mg by mouth         Allergies:  No Known Allergies    Family history:  Family History   Problem Relation Age of Onset    Breast Cancer Other         Maternal Aunt    Breast Cancer Other         Maternal Aunt    Colon Cancer Other         Maternal Aunt    Asthma Brother     Coronary Artery Disease Father     Hypertension Father        Social history:  Social History     Tobacco Use    Smoking status: Never     Passive exposure: Never    Smokeless tobacco: Never   Substance Use Topics    Alcohol use: Yes     Comment: occasional     Marital status: .    Nursing Notes:   Sheri Rodas  8/5/2024 12:10 PM  Signed  Chief Complaint   Patient presents with    Follow Up       Vitals:    08/05/24 1207   BP: 108/75   BP Location: Left arm   Patient Position: Sitting   Cuff Size: Adult Regular   Pulse: 70   SpO2: 99%   Weight: 128 lb 14.4 oz   Height: 5' 5\"       Body mass index is 21.45 kg/m .    BANDAR Duffy     10 minutes spent on the date of the encounter doing chart review, history and exam, documentation and further activities as noted above.   This is a postop visit.    SULEIMAN Jose, NP-C  Colon and Rectal Surgery  [facility]    This note was created using speech recognition software and may contain unintended word substitutions.    "

## 2024-08-05 ENCOUNTER — OFFICE VISIT (OUTPATIENT)
Dept: SURGERY | Facility: CLINIC | Age: 29
End: 2024-08-05
Payer: COMMERCIAL

## 2024-08-05 VITALS
SYSTOLIC BLOOD PRESSURE: 108 MMHG | WEIGHT: 128.9 LBS | DIASTOLIC BLOOD PRESSURE: 75 MMHG | BODY MASS INDEX: 21.48 KG/M2 | OXYGEN SATURATION: 99 % | HEIGHT: 65 IN | HEART RATE: 70 BPM

## 2024-08-05 DIAGNOSIS — Z09 FOLLOW-UP EXAMINATION AFTER COLORECTAL SURGERY: Primary | ICD-10-CM

## 2024-08-05 PROCEDURE — 99024 POSTOP FOLLOW-UP VISIT: CPT | Performed by: NURSE PRACTITIONER

## 2024-08-05 ASSESSMENT — PAIN SCALES - GENERAL: PAINLEVEL: NO PAIN (0)

## 2024-08-05 NOTE — LETTER
"2024       RE: Elena Merrill   [place] [address]     Dear Colleague,    Thank you for referring your patient, Elena Merrill, to the [facility] COLON AND RECTAL SURGERY CLINIC [place] at [facility]. Please see a copy of my visit note below.    Colon and Rectal Surgery Postoperative Clinic Note    RE: Elena Merrill  : 1995  JOSIAH: 2024    Elena Merrill is a very pleasant 29 year old female with anorectal fistula now status post ligation of intersphincteric fistula tract (LIFT) procedure on 24 by Dr. Mcdonough.     Interval history: Elena has been doing well. No pain but some minor bleeding when wiping after bowel movements.    Physical Examination: Exam was chaperoned by Sheri Rodas EMT   /75 (BP Location: Left arm, Patient Position: Sitting, Cuff Size: Adult Regular)   Pulse 70   Ht 5' 5\"   Wt 128 lb 14.4 oz   SpO2 99%   BMI 21.45 kg/m    General: Alert, oriented, in no acute distress, sitting comfortably  HEENT: Mucous membranes moist    Perianal external examination:  External site well healed. Internal site in the left anterior position with a small amount of hypergranulation tissue and purulent drainage.     Digital rectal examination: Was deferred.    Anoscopy: Was deferred.    Assessment/Plan:  29 year old female with anorectal fistula now status post ligation of intersphincteric fistula tract (LIFT) procedure on 24 by Dr. Mcdonough. I am concerned about a recurrent fistula at the internal site. Discussed with Dr. Mcdonough who would like to see her in clinic for exam and discuss possible EUA. Patient's questions were answered to her stated satisfaction and she is in agreement with this plan.     Medical history:  Past Medical History:   Diagnosis Date    Uncomplicated asthma     It was when I was younger.       Surgical history:  Past Surgical History:   Procedure Laterality Date    " "FISTULOTOMY RECTUM N/A 12/6/2023    Procedure: Exam under anesthesia and SETON PLACEMENT;  Surgeon: James Mcdonough MD;  Location: [facility] OR    LIGATION, FISTULA TRACT, INTERSPHINCTERIC N/A 5/23/2024    Procedure: LIGATION, FISTULA TRACT, INTERSPHINCTERIC;  Surgeon: James Mcdonough MD;  Location: [facility] OR       Problem list:    Patient Active Problem List    Diagnosis Date Noted    Anal fistula 10/23/2023     Priority: Medium       Medications:  Current Outpatient Medications   Medication Sig Dispense Refill    ciprofloxacin (CIPRO) 500 MG tablet Take 1 tablet (500 mg) by mouth 2 times daily 14 tablet 0    metroNIDAZOLE (FLAGYL) 250 MG tablet Take 1 tablet (250 mg) by mouth 3 times daily 15 tablet 0    traZODone (DESYREL) 100 MG tablet Take  mg by mouth         Allergies:  No Known Allergies    Family history:  Family History   Problem Relation Age of Onset    Breast Cancer Other         Maternal Aunt    Breast Cancer Other         Maternal Aunt    Colon Cancer Other         Maternal Aunt    Asthma Brother     Coronary Artery Disease Father     Hypertension Father      Social history:  Social History     Tobacco Use    Smoking status: Never     Passive exposure: Never    Smokeless tobacco: Never   Substance Use Topics    Alcohol use: Yes     Comment: occasional     Marital status: .    Nursing Notes:   Sheri Rodas  8/5/2024 12:10 PM  Signed  Chief Complaint   Patient presents with    Follow Up     Vitals:    08/05/24 1207   BP: 108/75   BP Location: Left arm   Patient Position: Sitting   Cuff Size: Adult Regular   Pulse: 70   SpO2: 99%   Weight: 128 lb 14.4 oz   Height: 5' 5\"     Body mass index is 21.45 kg/m .    BANDAR Duffy     10 minutes spent on the date of the encounter doing chart review, history and exam, documentation and further activities as noted above.   This is a postop visit.    This note was created using speech recognition software and may contain unintended word " substitutions.      Again, thank you for allowing me to participate in the care of your patient.      Sincerely,    SULEIMAN Daniels CNP

## 2024-08-05 NOTE — NURSING NOTE
"Chief Complaint   Patient presents with    Follow Up       Vitals:    08/05/24 1207   BP: 108/75   BP Location: Left arm   Patient Position: Sitting   Cuff Size: Adult Regular   Pulse: 70   SpO2: 99%   Weight: 128 lb 14.4 oz   Height: 5' 5\"       Body mass index is 21.45 kg/m .    Sheri Rodas, EMT  "

## 2024-08-06 ENCOUNTER — OFFICE VISIT (OUTPATIENT)
Dept: SURGERY | Facility: CLINIC | Age: 29
End: 2024-08-06
Payer: COMMERCIAL

## 2024-08-06 ENCOUNTER — ANESTHESIA EVENT (OUTPATIENT)
Dept: SURGERY | Facility: AMBULATORY SURGERY CENTER | Age: 29
End: 2024-08-06
Payer: COMMERCIAL

## 2024-08-06 VITALS — SYSTOLIC BLOOD PRESSURE: 118 MMHG | OXYGEN SATURATION: 100 % | HEART RATE: 78 BPM | DIASTOLIC BLOOD PRESSURE: 75 MMHG

## 2024-08-06 DIAGNOSIS — K60.30 ANAL FISTULA: Primary | ICD-10-CM

## 2024-08-06 DIAGNOSIS — K60.329 TRANSSPHINCTERIC ANAL FISTULA: Primary | ICD-10-CM

## 2024-08-06 PROCEDURE — 99024 POSTOP FOLLOW-UP VISIT: CPT | Performed by: SURGERY

## 2024-08-06 RX ORDER — ONDANSETRON 2 MG/ML
4 INJECTION INTRAMUSCULAR; INTRAVENOUS EVERY 30 MIN PRN
Status: DISCONTINUED | OUTPATIENT
Start: 2024-08-06 | End: 2024-08-08 | Stop reason: HOSPADM

## 2024-08-06 RX ORDER — FENTANYL CITRATE 50 UG/ML
25 INJECTION, SOLUTION INTRAMUSCULAR; INTRAVENOUS EVERY 5 MIN PRN
Status: DISCONTINUED | OUTPATIENT
Start: 2024-08-06 | End: 2024-08-08 | Stop reason: HOSPADM

## 2024-08-06 RX ORDER — ONDANSETRON 4 MG/1
4 TABLET, ORALLY DISINTEGRATING ORAL EVERY 30 MIN PRN
Status: DISCONTINUED | OUTPATIENT
Start: 2024-08-06 | End: 2024-08-08 | Stop reason: HOSPADM

## 2024-08-06 RX ORDER — OXYCODONE HYDROCHLORIDE 5 MG/1
10 TABLET ORAL
Status: DISCONTINUED | OUTPATIENT
Start: 2024-08-06 | End: 2024-08-08 | Stop reason: HOSPADM

## 2024-08-06 RX ORDER — LABETALOL HYDROCHLORIDE 5 MG/ML
10 INJECTION, SOLUTION INTRAVENOUS
Status: DISCONTINUED | OUTPATIENT
Start: 2024-08-06 | End: 2024-08-08 | Stop reason: HOSPADM

## 2024-08-06 RX ORDER — OXYCODONE HYDROCHLORIDE 5 MG/1
5 TABLET ORAL
Status: DISCONTINUED | OUTPATIENT
Start: 2024-08-06 | End: 2024-08-08 | Stop reason: HOSPADM

## 2024-08-06 RX ORDER — NALOXONE HYDROCHLORIDE 0.4 MG/ML
0.1 INJECTION, SOLUTION INTRAMUSCULAR; INTRAVENOUS; SUBCUTANEOUS
Status: DISCONTINUED | OUTPATIENT
Start: 2024-08-06 | End: 2024-08-08 | Stop reason: HOSPADM

## 2024-08-06 RX ORDER — FENTANYL CITRATE 50 UG/ML
50 INJECTION, SOLUTION INTRAMUSCULAR; INTRAVENOUS EVERY 5 MIN PRN
Status: DISCONTINUED | OUTPATIENT
Start: 2024-08-06 | End: 2024-08-08 | Stop reason: HOSPADM

## 2024-08-06 RX ORDER — HYDROMORPHONE HYDROCHLORIDE 1 MG/ML
0.4 INJECTION, SOLUTION INTRAMUSCULAR; INTRAVENOUS; SUBCUTANEOUS EVERY 5 MIN PRN
Status: DISCONTINUED | OUTPATIENT
Start: 2024-08-06 | End: 2024-08-08 | Stop reason: HOSPADM

## 2024-08-06 RX ORDER — DEXAMETHASONE SODIUM PHOSPHATE 10 MG/ML
4 INJECTION, SOLUTION INTRAMUSCULAR; INTRAVENOUS
Status: DISCONTINUED | OUTPATIENT
Start: 2024-08-06 | End: 2024-08-08 | Stop reason: HOSPADM

## 2024-08-06 RX ORDER — HYDROMORPHONE HYDROCHLORIDE 1 MG/ML
0.2 INJECTION, SOLUTION INTRAMUSCULAR; INTRAVENOUS; SUBCUTANEOUS EVERY 5 MIN PRN
Status: DISCONTINUED | OUTPATIENT
Start: 2024-08-06 | End: 2024-08-08 | Stop reason: HOSPADM

## 2024-08-06 RX ORDER — SODIUM CHLORIDE, SODIUM LACTATE, POTASSIUM CHLORIDE, CALCIUM CHLORIDE 600; 310; 30; 20 MG/100ML; MG/100ML; MG/100ML; MG/100ML
INJECTION, SOLUTION INTRAVENOUS CONTINUOUS
Status: DISCONTINUED | OUTPATIENT
Start: 2024-08-06 | End: 2024-08-08 | Stop reason: HOSPADM

## 2024-08-06 ASSESSMENT — PAIN SCALES - GENERAL: PAINLEVEL: NO PAIN (0)

## 2024-08-06 NOTE — PROGRESS NOTES
Colon and Rectal Surgery Clinic Note    RE: Elena Merrill.  : 1995.  JOSIAH: 2024.    Reason for visit: anal fistula.    HPI: Elena Merrill is a 29 year old female who presents today for an anal fistula. She has a past medical history of asthma. I took her to the OR on 2024 for a seton placement. MRI pelvis on 2024 demonstrated a transsphincteric left perianal fistula. She underwent a LIFT on 2024. She met with Estefany Benedict NP yesterday. There was concern for a recurrent anal fistula.     Interval History: Experiencing some minor bleeding.    Medications:  Current Outpatient Medications   Medication Sig Dispense Refill    traZODone (DESYREL) 100 MG tablet Take  mg by mouth      ciprofloxacin (CIPRO) 500 MG tablet Take 1 tablet (500 mg) by mouth 2 times daily 14 tablet 0    metroNIDAZOLE (FLAGYL) 250 MG tablet Take 1 tablet (250 mg) by mouth 3 times daily 15 tablet 0       Allergies:  No Known Allergies    Social history:   Social History     Tobacco Use    Smoking status: Never     Passive exposure: Never    Smokeless tobacco: Never   Substance Use Topics    Alcohol use: Yes     Comment: occasional     Marital status: .    ROS:  A complete review of systems was performed with the patient and all systems negative except as per HPI.    Physical Examination:  Exam was chaperoned by BANDAR Hayden-P  /75 (BP Location: Left arm, Patient Position: Sitting, Cuff Size: Adult Regular)   Pulse 78   SpO2 100%   General: Well hydrated. No acute distress.  Abdomen: Soft, NT. No inguinal adenopathy palpated.  Perianal external examination: Well healed perineal incision. Minimal granulation tissue just on the inner part of the anal verge.      ASSESSMENT    This is a 29 year old F with a transsphincteric anal fistula s/p LIFT on 2024, now with likely intersphincteric fistula.This would require a fistulotomy. All questions were answered.  Risks, benefits, and  alternatives of operative treatment were thoroughly discussed with the patient, he/she understands these well and agrees to proceed.    All pertinent labs and imaging were personally reviewed by me.    PLAN  - EUA with fistulotomy     30 minutes spent on the date of the encounter doing chart review, history and exam, imaging review, documentation and further activities as noted above.      James Mcdonough MD    Division of Colon and Rectal Surgery  [facility]    Referring Provider:  No referring provider defined for this encounter.     Primary Care Provider:  Denisa Esposito  [phone]  female  29 year old    H&P for upcoming surgery.    Reason for procedure/surgery: Return post LIFT.    Patient Active Problem List   Diagnosis    Anal fistula    Transsphincteric anal fistula       Past Surgical History:    Past Surgical History:   Procedure Laterality Date    FISTULOTOMY RECTUM N/A 12/6/2023    Procedure: Exam under anesthesia and SETON PLACEMENT;  Surgeon: James Mcdonough MD;  Location: [facility] OR    LIGATION, FISTULA TRACT, INTERSPHINCTERIC N/A 5/23/2024    Procedure: LIGATION, FISTULA TRACT, INTERSPHINCTERIC;  Surgeon: James Mcdonough MD;  Location: [facility] OR       Past Medical History:   Past Medical History:   Diagnosis Date    Uncomplicated asthma     It was when I was younger.       Social History:   Social History     Tobacco Use    Smoking status: Never     Passive exposure: Never    Smokeless tobacco: Never   Substance Use Topics    Alcohol use: Yes     Comment: occasional       Family History:   Family History   Problem Relation Age of Onset    Breast Cancer Other         Maternal Aunt    Breast Cancer Other         Maternal Aunt    Colon Cancer Other         Maternal Aunt    Asthma Brother     Coronary Artery Disease Father     Hypertension Father        Allergies: No Known Allergies    Active Medications:   Current Outpatient  Medications   Medication Sig Dispense Refill    traZODone (DESYREL) 100 MG tablet Take  mg by mouth      ciprofloxacin (CIPRO) 500 MG tablet Take 1 tablet (500 mg) by mouth 2 times daily 14 tablet 0    metroNIDAZOLE (FLAGYL) 250 MG tablet Take 1 tablet (250 mg) by mouth 3 times daily 15 tablet 0       Physical Examination:   Vital Signs: /75 (BP Location: Left arm, Patient Position: Sitting, Cuff Size: Adult Regular)   Pulse 78   SpO2 100%   GA: NAD, A&O x3  L: nonlabored breathing on RA  CV: RRR  ABD: non tender  Neuro: grossly normal    Plan: Appropriate to proceed as scheduled.      James Mcdonough MD    Division of Colon & Rectal Surgery  Department of Surgery  [facility]  [phone]    8/6/2024    PCP:  Denisa Esposito

## 2024-08-06 NOTE — PATIENT INSTRUCTIONS
Follow up:    Scheduling will give you a call within three business days to schedule surgery    Appointment you will need in prep: pre op physical with our anesthesia team or your PCP  You will need to do a 2 fleet enema bowel prep the day of surgery. You will receive the instructions in a surgery packet via mail and Ushi.  BOWEL PREP: FLEET ENEMA INSTRUCTIONS     Purchase 2 Fleet enemas over the counter at any local pharmacy or store.  There is no prescription required.  Start your prep 2 hours prior to your appointment check-in time.     2 Hours Prior to Arrival for Your Procedure or Surgery     1. Remove orange protective shield from enema Comfortip before inserting.     2. With steady pressure, gently insert enema tip into rectum with a slight side-to-side movement, with tip pointing toward the navel.  Insertion may be easier if you bear down, as if you are having a bowel movement.     3. Do not force the enema tip into rectum, as this can cause injury.     4. squeeze bottle until nearly all liquid is gone.  It is not neccessary to empty the bottle      5. Remove Comforttip from rectum, and maintain position until you feel the urge to evacuate is strong (usually 2-5 minutes).       Repeat the same steps for the second enema 30 minutes after completing the first enema.      For any questions or concerns, please contact our care coordinators- Patricia RN: [phone] or Herlinda RN: [phone]      PREETHI Gomez [phone]    Clinic Fax Number [phone]    Surgery Scheduling [phone]    My Chart is available 24 hours a day and is a secure way to access your records and communicate with your care team.  I strongly recommend signing up if you haven't already done so, if you are comfortable with computers.  If you would like to inquire about this or are having problems with My Chart access, you may call [phone] or go online at [email or web address].  Please allow at least 24 hours for a response and  extra time on weekends and Holidays.

## 2024-08-06 NOTE — NURSING NOTE
Chief Complaint   Patient presents with    Follow Up       Vitals:    08/06/24 0958   BP: 118/75   BP Location: Left arm   Patient Position: Sitting   Cuff Size: Adult Regular   Pulse: 78   SpO2: 100%       There is no height or weight on file to calculate BMI.    Michael Bonner EMT-P

## 2024-08-07 ENCOUNTER — HOSPITAL ENCOUNTER (OUTPATIENT)
Facility: AMBULATORY SURGERY CENTER | Age: 29
Discharge: HOME | End: 2024-08-07
Attending: SURGERY | Admitting: SURGERY
Payer: COMMERCIAL

## 2024-08-07 ENCOUNTER — ANESTHESIA (OUTPATIENT)
Dept: SURGERY | Facility: AMBULATORY SURGERY CENTER | Age: 29
End: 2024-08-07
Payer: COMMERCIAL

## 2024-08-07 VITALS
HEART RATE: 68 BPM | SYSTOLIC BLOOD PRESSURE: 111 MMHG | DIASTOLIC BLOOD PRESSURE: 64 MMHG | OXYGEN SATURATION: 99 % | RESPIRATION RATE: 16 BRPM | TEMPERATURE: 97.6 F

## 2024-08-07 DIAGNOSIS — K60.30 ANAL FISTULA: Primary | ICD-10-CM

## 2024-08-07 DIAGNOSIS — G89.18 ACUTE POST-OPERATIVE PAIN: ICD-10-CM

## 2024-08-07 PROCEDURE — 46270 REMOVE ANAL FIST SUBQ: CPT | Performed by: ANESTHESIOLOGY

## 2024-08-07 PROCEDURE — 46270 REMOVE ANAL FIST SUBQ: CPT | Performed by: NURSE ANESTHETIST, CERTIFIED REGISTERED

## 2024-08-07 PROCEDURE — 81025 URINE PREGNANCY TEST: CPT | Performed by: PATHOLOGY

## 2024-08-07 PROCEDURE — 46275 REMOVE ANAL FIST INTER: CPT

## 2024-08-07 PROCEDURE — 46275 REMOVE ANAL FIST INTER: CPT | Mod: 58 | Performed by: SURGERY

## 2024-08-07 RX ORDER — HYDROMORPHONE HYDROCHLORIDE 1 MG/ML
0.4 INJECTION, SOLUTION INTRAMUSCULAR; INTRAVENOUS; SUBCUTANEOUS EVERY 5 MIN PRN
Status: DISCONTINUED | OUTPATIENT
Start: 2024-08-07 | End: 2024-08-08 | Stop reason: HOSPADM

## 2024-08-07 RX ORDER — ONDANSETRON 2 MG/ML
4 INJECTION INTRAMUSCULAR; INTRAVENOUS ONCE
Status: DISCONTINUED | OUTPATIENT
Start: 2024-08-07 | End: 2024-08-07 | Stop reason: HOSPADM

## 2024-08-07 RX ORDER — DEXAMETHASONE SODIUM PHOSPHATE 10 MG/ML
4 INJECTION, SOLUTION INTRAMUSCULAR; INTRAVENOUS
Status: DISCONTINUED | OUTPATIENT
Start: 2024-08-07 | End: 2024-08-08 | Stop reason: HOSPADM

## 2024-08-07 RX ORDER — ONDANSETRON 4 MG/1
4 TABLET, ORALLY DISINTEGRATING ORAL EVERY 30 MIN PRN
Status: DISCONTINUED | OUTPATIENT
Start: 2024-08-07 | End: 2024-08-08 | Stop reason: HOSPADM

## 2024-08-07 RX ORDER — LIDOCAINE 40 MG/G
CREAM TOPICAL
Status: DISCONTINUED | OUTPATIENT
Start: 2024-08-07 | End: 2024-08-07 | Stop reason: HOSPADM

## 2024-08-07 RX ORDER — FENTANYL CITRATE 50 UG/ML
25 INJECTION, SOLUTION INTRAMUSCULAR; INTRAVENOUS EVERY 5 MIN PRN
Status: DISCONTINUED | OUTPATIENT
Start: 2024-08-07 | End: 2024-08-08 | Stop reason: HOSPADM

## 2024-08-07 RX ORDER — PROPOFOL 10 MG/ML
INJECTION, EMULSION INTRAVENOUS CONTINUOUS PRN
Status: DISCONTINUED | OUTPATIENT
Start: 2024-08-07 | End: 2024-08-07

## 2024-08-07 RX ORDER — ONDANSETRON 2 MG/ML
4 INJECTION INTRAMUSCULAR; INTRAVENOUS EVERY 30 MIN PRN
Status: DISCONTINUED | OUTPATIENT
Start: 2024-08-07 | End: 2024-08-08 | Stop reason: HOSPADM

## 2024-08-07 RX ORDER — NALOXONE HYDROCHLORIDE 0.4 MG/ML
0.1 INJECTION, SOLUTION INTRAMUSCULAR; INTRAVENOUS; SUBCUTANEOUS
Status: DISCONTINUED | OUTPATIENT
Start: 2024-08-07 | End: 2024-08-08 | Stop reason: HOSPADM

## 2024-08-07 RX ORDER — SODIUM CHLORIDE, SODIUM LACTATE, POTASSIUM CHLORIDE, CALCIUM CHLORIDE 600; 310; 30; 20 MG/100ML; MG/100ML; MG/100ML; MG/100ML
INJECTION, SOLUTION INTRAVENOUS CONTINUOUS
Status: DISCONTINUED | OUTPATIENT
Start: 2024-08-07 | End: 2024-08-08 | Stop reason: HOSPADM

## 2024-08-07 RX ORDER — ONDANSETRON 4 MG/1
4 TABLET, ORALLY DISINTEGRATING ORAL
Status: DISCONTINUED | OUTPATIENT
Start: 2024-08-07 | End: 2024-08-08 | Stop reason: HOSPADM

## 2024-08-07 RX ORDER — FENTANYL CITRATE 50 UG/ML
50 INJECTION, SOLUTION INTRAMUSCULAR; INTRAVENOUS EVERY 5 MIN PRN
Status: DISCONTINUED | OUTPATIENT
Start: 2024-08-07 | End: 2024-08-08 | Stop reason: HOSPADM

## 2024-08-07 RX ORDER — DEXAMETHASONE SODIUM PHOSPHATE 4 MG/ML
INJECTION, SOLUTION INTRA-ARTICULAR; INTRALESIONAL; INTRAMUSCULAR; INTRAVENOUS; SOFT TISSUE PRN
Status: DISCONTINUED | OUTPATIENT
Start: 2024-08-07 | End: 2024-08-07

## 2024-08-07 RX ORDER — PROPOFOL 10 MG/ML
INJECTION, EMULSION INTRAVENOUS PRN
Status: DISCONTINUED | OUTPATIENT
Start: 2024-08-07 | End: 2024-08-07

## 2024-08-07 RX ORDER — SODIUM CHLORIDE, SODIUM LACTATE, POTASSIUM CHLORIDE, CALCIUM CHLORIDE 600; 310; 30; 20 MG/100ML; MG/100ML; MG/100ML; MG/100ML
INJECTION, SOLUTION INTRAVENOUS CONTINUOUS
Status: DISCONTINUED | OUTPATIENT
Start: 2024-08-07 | End: 2024-08-07 | Stop reason: HOSPADM

## 2024-08-07 RX ORDER — HYDROMORPHONE HYDROCHLORIDE 1 MG/ML
0.2 INJECTION, SOLUTION INTRAMUSCULAR; INTRAVENOUS; SUBCUTANEOUS EVERY 5 MIN PRN
Status: DISCONTINUED | OUTPATIENT
Start: 2024-08-07 | End: 2024-08-08 | Stop reason: HOSPADM

## 2024-08-07 RX ORDER — OXYCODONE HYDROCHLORIDE 5 MG/1
5 TABLET ORAL
Status: DISCONTINUED | OUTPATIENT
Start: 2024-08-07 | End: 2024-08-08 | Stop reason: HOSPADM

## 2024-08-07 RX ORDER — ONDANSETRON 2 MG/ML
INJECTION INTRAMUSCULAR; INTRAVENOUS PRN
Status: DISCONTINUED | OUTPATIENT
Start: 2024-08-07 | End: 2024-08-07

## 2024-08-07 RX ORDER — OXYCODONE HYDROCHLORIDE 5 MG/1
10 TABLET ORAL
Status: DISCONTINUED | OUTPATIENT
Start: 2024-08-07 | End: 2024-08-08 | Stop reason: HOSPADM

## 2024-08-07 RX ORDER — ACETAMINOPHEN 325 MG/1
975 TABLET ORAL ONCE
Status: DISCONTINUED | OUTPATIENT
Start: 2024-08-07 | End: 2024-08-07 | Stop reason: HOSPADM

## 2024-08-07 RX ORDER — FENTANYL CITRATE 50 UG/ML
INJECTION, SOLUTION INTRAMUSCULAR; INTRAVENOUS PRN
Status: DISCONTINUED | OUTPATIENT
Start: 2024-08-07 | End: 2024-08-07

## 2024-08-07 RX ORDER — OXYCODONE HYDROCHLORIDE 5 MG/1
5 TABLET ORAL EVERY 8 HOURS PRN
Qty: 5 TABLET | Refills: 0 | Status: SHIPPED | OUTPATIENT
Start: 2024-08-07

## 2024-08-07 RX ORDER — LIDOCAINE HYDROCHLORIDE 20 MG/ML
INJECTION, SOLUTION INFILTRATION; PERINEURAL PRN
Status: DISCONTINUED | OUTPATIENT
Start: 2024-08-07 | End: 2024-08-07

## 2024-08-07 RX ADMIN — DEXAMETHASONE SODIUM PHOSPHATE 4 MG: 4 INJECTION, SOLUTION INTRA-ARTICULAR; INTRALESIONAL; INTRAMUSCULAR; INTRAVENOUS; SOFT TISSUE at 13:04

## 2024-08-07 RX ADMIN — PROPOFOL 30 MG: 10 INJECTION, EMULSION INTRAVENOUS at 13:02

## 2024-08-07 RX ADMIN — PROPOFOL 150 MCG/KG/MIN: 10 INJECTION, EMULSION INTRAVENOUS at 13:00

## 2024-08-07 RX ADMIN — ONDANSETRON 4 MG: 2 INJECTION INTRAMUSCULAR; INTRAVENOUS at 13:07

## 2024-08-07 RX ADMIN — FENTANYL CITRATE 50 MCG: 50 INJECTION, SOLUTION INTRAMUSCULAR; INTRAVENOUS at 13:06

## 2024-08-07 RX ADMIN — LIDOCAINE HYDROCHLORIDE 60 MG: 20 INJECTION, SOLUTION INFILTRATION; PERINEURAL at 12:57

## 2024-08-07 RX ADMIN — PROPOFOL 30 MG: 10 INJECTION, EMULSION INTRAVENOUS at 12:59

## 2024-08-07 RX ADMIN — SODIUM CHLORIDE, SODIUM LACTATE, POTASSIUM CHLORIDE, CALCIUM CHLORIDE: 600; 310; 30; 20 INJECTION, SOLUTION INTRAVENOUS at 12:45

## 2024-08-07 RX ADMIN — FENTANYL CITRATE 25 MCG: 50 INJECTION, SOLUTION INTRAMUSCULAR; INTRAVENOUS at 13:02

## 2024-08-07 RX ADMIN — FENTANYL CITRATE 25 MCG: 50 INJECTION, SOLUTION INTRAMUSCULAR; INTRAVENOUS at 12:57

## 2024-08-07 NOTE — ANESTHESIA PREPROCEDURE EVALUATION
Anesthesia Pre-Procedure Evaluation    Patient: Elena Merrill   MRN: [id number] : 1995        Procedure : Procedure(s):  FISTULOTOMY, RECTUM          Past Medical History:   Diagnosis Date     Uncomplicated asthma     It was when I was younger.      Past Surgical History:   Procedure Laterality Date     FISTULOTOMY RECTUM N/A 2023    Procedure: Exam under anesthesia and SETON PLACEMENT;  Surgeon: James Mcdonough MD;  Location: [facility] OR     LIGATION, FISTULA TRACT, INTERSPHINCTERIC N/A 2024    Procedure: LIGATION, FISTULA TRACT, INTERSPHINCTERIC;  Surgeon: aJmes Mcdonough MD;  Location: [facility] OR      No Known Allergies   Social History     Tobacco Use     Smoking status: Never     Passive exposure: Never     Smokeless tobacco: Never   Substance Use Topics     Alcohol use: Yes     Comment: occasional      Wt Readings from Last 1 Encounters:   24 58.5 kg (128 lb 14.4 oz)        Anesthesia Evaluation   Pt has had prior anesthetic. Type: MAC.        ROS/MED HX  ENT/Pulmonary:  - neg pulmonary ROS     Neurologic:  - neg neurologic ROS     Cardiovascular:  - neg cardiovascular ROS     METS/Exercise Tolerance: >4 METS    Hematologic:  - neg hematologic  ROS     Musculoskeletal:  - neg musculoskeletal ROS     GI/Hepatic:  - neg GI/hepatic ROS     Renal/Genitourinary:  - neg Renal ROS     Endo:  - neg endo ROS     Psychiatric/Substance Use:  - neg psychiatric ROS     Infectious Disease:  - neg infectious disease ROS     Malignancy:  - neg malignancy ROS     Other:            Physical Exam    Airway        Mallampati: I   TM distance: > 3 FB   Neck ROM: full   Mouth opening: > 3 cm    Respiratory Devices and Support         Dental       (+) Minor Abnormalities - some fillings, tiny chips      Cardiovascular   cardiovascular exam normal       Rhythm and rate: regular and normal     Pulmonary   pulmonary exam normal        breath sounds clear to auscultation       OUTSIDE LABS:  CBC:   Lab Results  "  Component Value Date    WBC 14.8 (H) 10/03/2023    HGB 13.4 10/03/2023    HCT 40.0 10/03/2023     10/03/2023     BMP:   Lab Results   Component Value Date     (L) 10/03/2023    POTASSIUM 3.5 10/03/2023    CHLORIDE 102 10/03/2023    CO2 20 (L) 10/03/2023    BUN 11.2 10/03/2023    CR 0.73 10/03/2023    GLC 87 10/03/2023     COAGS: No results found for: \"PTT\", \"INR\", \"FIBR\"  POC:   Lab Results   Component Value Date    HCG Negative 05/23/2024     HEPATIC: No results found for: \"ALBUMIN\", \"PROTTOTAL\", \"ALT\", \"AST\", \"GGT\", \"ALKPHOS\", \"BILITOTAL\", \"BILIDIRECT\", \"KINZA\"  OTHER:   Lab Results   Component Value Date    CLAUDIA 9.4 10/03/2023       Anesthesia Plan    ASA Status:  1    NPO Status:  NPO Appropriate    Anesthesia Type: MAC.     - Reason for MAC: straight local not clinically adequate              Consents    Anesthesia Plan(s) and associated risks, benefits, and realistic alternatives discussed. Questions answered and patient/representative(s) expressed understanding.     - Discussed:     - Discussed with:  Patient      - Extended Intubation/Ventilatory Support Discussed: No.      - Patient is DNR/DNI Status: No     Use of blood products discussed: No .     Postoperative Care    Pain management: IV analgesics, Oral pain medications, Multi-modal analgesia.   PONV prophylaxis: Background Propofol Infusion     Comments:               Shea Colby MD    I have reviewed the pertinent notes and labs in the chart from the past 30 days and (re)examined the patient.  Any updates or changes from those notes are reflected in this note.                  "

## 2024-08-07 NOTE — DISCHARGE INSTRUCTIONS
[facility]  Home Care Following Anesthesia  For 24 hours after surgery:  Get plenty of rest.  A responsible adult must stay with you for at least 24 hours after you leave the surgery center.  Do not drive or use heavy equipment.  If you have weakness or tingling, don't drive or use heavy equipment until this feeling goes away.   Do not drink alcohol.   Avoid strenuous or risky activities.  Ask for help when climbing stairs.  You may feel lightheaded.  IF so, sit for a few minutes before standing.  Have someone help you get up.   If you have nausea (feel sick to your stomach): Drink only clear liquids such as apple juice, ginger ale, broth or 7-Up.  Rest may also help.  Be sure to drink enough fluids.  Move to a regular diet as you feel able.   You may have a slight fever.  Call the doctor if your fever is over 100 F (37.7 C) (taken under the tongue) or lasts longer than 24 hours.  You may have a dry mouth, a sore throat, muscle aches or trouble sleeping. These should go away after 24 hours.  Do not make important or legal decisions.   It is recommended to avoid smoking.               Tips for taking pain medications  To get the best pain relief possible, remember these points:  Take pain medications as directed, before pain becomes severe.  Pain medication can upset your stomach: taking it with food may help.  Constipation is a common side effect of pain medication. Drink plenty of  fluids.  Eat foods high in fiber. Take a stool softener if recommended by your doctor or pharmacist.  Do not drink alcohol, drive or operate machinery while taking pain medications.  Ask about other ways to control pain, such as with heat, ice or relaxation.    Tylenol/Acetaminophen Consumption    If you feel your pain relief is insufficient, you may take Tylenol/Acetaminophen in addition to your narcotic pain medication.   Be careful not to exceed 4,000 mg of Tylenol/Acetaminophen in a 24 hour  period from all sources.  If you are taking extra strength Tylenol/acetaminophen (500 mg), the maximum dose is 8 tablets in 24 hours.  If you are taking regular strength acetaminophen (325 mg), the maximum dose is 12 tablets in 24 hours.    Call a doctor for any of the following:  Signs of infection (fever, growing tenderness at the surgery site, a large amount of drainage or bleeding, severe pain, foul-smelling drainage, redness, swelling).  It has been over 8 to 10 hours since surgery and you are still not able to urinate (pass water).  Headache for over 24 hours.  Numbness, tingling or weakness the day after surgery (if you had spinal anesthesia).  Signs of Covid-19 infection (temperature over 100 degrees, shortness of breath, cough, loss of taste/smell, generalized body aches, persistent headache, chills, sore throat, nausea/vomiting/diarrhea)  Your doctor is:  Dr. James Mcdonough, Colon Rectal: [phone]                    Or dial [phone] and ask for the resident on call for:  Colon Rectal  For emergency care, call the:  [facility]:  [phone] (TTY for hearing impaired: [phone])

## 2024-08-07 NOTE — OP NOTE
"Colorectal surgery operative note    PREOPERATIVE DIAGNOSIS:  1. History of anorectal infection.  2. Intersphincteric fistula s/p LIFT    POSTOPERATIVE DIAGNOSIS:   1. History of anorectal infection.  2. Intersphincteric fistula s/p LIFT      PROCEDURE:  1. Evaluation under anesthesia.  2. Lay-open fistulotomy    ANESTHESIA: MAC plus local anesthesia.    SURGEON: James Mcdonough M.D.    ASSISTANT(S): Nicole Porter MD, R1.    INDICATIONS FOR PROCEDURE  Elena Merrill is a 29 year old female who presented with clinical suspicion for an intersphincteric fistula s/p LIFT. I thoroughly discussed the risks, benefits, and alternatives of operative treatment with the patient and he/she agreed to proceed.    General risks related to anorectal surgery were reviewed with the patient. These include, but are not limited to urinary retention, abscess, infection, bleeding, chronic pain, anal stenosis, fistula, fissure, and fecal incontinence.     OPERATIVE PROCEDURE: After obtaining informed consent, the patient was brought to the operating room and placed in the prone jackknife position. MAC anesthesia was gently induced. Bilateral lower extremity pneumatic compression devices were applied and all pressure points were cushioned. The perianal area was then preped and draped in the standard sterile fashion. After a \"time-out\" was performed, a total of 30 mL of Bupivacaine 0.25% and Lidocaine 1% with epinephrine was injected as a pudendal block. Digital rectal exam and anoscopy were performed.  Findings: small intersphincteric fistula right on the midline. The fistula was layed open with monopolar electrocautery. The granulation tissue was cauterized. The distal rectum and anal canal were irrigated. Hemostasis was achieved. Instrument, sponge, and needle counts were all correct as reported to me. Bacitracin was applied, as well as a sterile dressing. The patient tolerated the procedure well.    COMPLICATIONS: none, " immediately.    ESTIMATED BLOOD LOSS: 2 mL.    SPECIMEN(S): None.    DRAINS/TUBES: None    OPERATIVE FINDINGS: Intersphincteric fistula.    DISPOSITION: PACU.    James Mcdonough MD    Division of Colon & Rectal Surgery  Department of Surgery  [facility]  [phone]

## 2024-08-07 NOTE — ANESTHESIA CARE TRANSFER NOTE
Patient: Elena Merrill    Procedure: Procedure(s):  FISTULOTOMY, RECTUM       Diagnosis: Transsphincteric anal fistula [K60.3]  Diagnosis Additional Information: No value filed.    Anesthesia Type:   MAC     Note:    Oropharynx: oropharynx clear of all foreign objects and spontaneously breathing  Level of Consciousness: awake  Oxygen Supplementation: room air    Independent Airway: airway patency satisfactory and stable  Dentition: dentition unchanged  Vital Signs Stable: post-procedure vital signs reviewed and stable  Report to RN Given: handoff report given  Patient transferred to: Phase II    Handoff Report: Identifed the Patient, Identified the Reponsible Provider, Reviewed the pertinent medical history, Discussed the surgical course, Reviewed Intra-OP anesthesia mangement and issues during anesthesia, Set expectations for post-procedure period and Allowed opportunity for questions and acknowledgement of understanding      Vitals:  Vitals Value Taken Time   /68 08/07/24 1318   Temp 36.4  C (97.6  F) 08/07/24 1318   Pulse 110    Resp 16 08/07/24 1318   SpO2 97 % 08/07/24 1318       Electronically Signed By: SULEIMAN Blue CRNA  August 7, 2024  1:19 PM

## 2024-08-07 NOTE — ANESTHESIA POSTPROCEDURE EVALUATION
Patient: Elena Merrill    Procedure: Procedure(s):  FISTULOTOMY, RECTUM       Anesthesia Type:  MAC    Note:  Disposition: Outpatient   Postop Pain Control: Uneventful            Sign Out: Well controlled pain   PONV: No   Neuro/Psych: Uneventful            Sign Out: Acceptable/Baseline neuro status   Airway/Respiratory: Uneventful            Sign Out: Acceptable/Baseline resp. status   CV/Hemodynamics: Uneventful            Sign Out: Acceptable CV status; No obvious hypovolemia; No obvious fluid overload   Other NRE: NONE   DID A NON-ROUTINE EVENT OCCUR? No       Last vitals:  Vitals Value Taken Time   /64 08/07/24 1345   Temp 36.4  C (97.6  F) 08/07/24 1345   Pulse     Resp 16 08/07/24 1345   SpO2 99 % 08/07/24 1345       Electronically Signed By: Shea Colby MD  August 7, 2024  2:21 PM

## 2024-08-20 ENCOUNTER — OFFICE VISIT (OUTPATIENT)
Dept: SURGERY | Facility: CLINIC | Age: 29
End: 2024-08-20
Payer: COMMERCIAL

## 2024-08-20 VITALS
WEIGHT: 130 LBS | SYSTOLIC BLOOD PRESSURE: 119 MMHG | HEIGHT: 65 IN | DIASTOLIC BLOOD PRESSURE: 82 MMHG | BODY MASS INDEX: 21.66 KG/M2 | HEART RATE: 78 BPM | OXYGEN SATURATION: 100 %

## 2024-08-20 DIAGNOSIS — K60.30 ANAL FISTULA: ICD-10-CM

## 2024-08-20 DIAGNOSIS — Z09 FOLLOW-UP EXAMINATION AFTER COLORECTAL SURGERY: Primary | ICD-10-CM

## 2024-08-20 PROCEDURE — 99024 POSTOP FOLLOW-UP VISIT: CPT | Performed by: NURSE PRACTITIONER

## 2024-08-20 ASSESSMENT — PAIN SCALES - GENERAL: PAINLEVEL: NO PAIN (1)

## 2024-08-20 NOTE — LETTER
"2024       RE: Elena Merrill   [place] [address]     Dear Colleague,    Thank you for referring your patient, Elena Merrill, to the [facility] [place] at [facility]. Please see a copy of my visit note below.    Colon and Rectal Surgery Postoperative Clinic Note    RE: Elena Merrill  : 1995  JOSIAH: 2024    Elena Merrill is a very pleasant 29 year old female with anal fistula status post LIFT who subsequently developed an intersphincteric fistula and is now status post lay open fistulotomy on 24 by Dr. Mcdonough.    Interval history: Elena has been doing well. No significant pain and she feels fully healed. No bleeding or drainage. Is on a daily fiber supplement and no issues with bowel movements.    Physical Examination: Exam was chaperoned by Sheri Rodas, EMT and Dr. Claros, GI Fellow  /82 (BP Location: Right arm, Patient Position: Sitting, Cuff Size: Adult Regular)   Pulse 78   Ht 5' 5\"   Wt 130 lb   SpO2 100%   BMI 21.63 kg/m    General: alert, oriented, in no acute distress, sitting comfortably  HEENT: mucous membranes moist    Perianal external examination:  Fistulotomy site in the anterior position with good granulation tissue present    Digital rectal examination: Was deferred.    Anoscopy: Was deferred.    Assessment/Plan:  29 year old female with anal fistula status post LIFT who subsequently developed an intersphincteric fistula and is now status post lay open fistulotomy on 24 by Dr. Mcdonough. She is doing well. No pain, bleeding, or drainage. No difficulty with bowel movements. No activity restrictions. Can follow up as needed.    Medical history:  Past Medical History:   Diagnosis Date     Uncomplicated asthma     It was when I was younger.       Surgical history:  Past Surgical History:   Procedure Laterality Date     FISTULOTOMY RECTUM N/A 2023    " "Procedure: Exam under anesthesia and SETON PLACEMENT;  Surgeon: James Mcdonough MD;  Location: [facility] OR     FISTULOTOMY RECTUM N/A 8/7/2024    Procedure: FISTULOTOMY, RECTUM;  Surgeon: James Mcdonough MD;  Location: [facility] OR     LIGATION, FISTULA TRACT, INTERSPHINCTERIC N/A 5/23/2024    Procedure: LIGATION, FISTULA TRACT, INTERSPHINCTERIC;  Surgeon: James Mcdonough MD;  Location: [facility] OR       Problem list:    Patient Active Problem List    Diagnosis Date Noted     Transsphincteric anal fistula 08/06/2024     Priority: Medium     Anal fistula 10/23/2023     Priority: Medium       Medications:  Current Outpatient Medications   Medication Sig Dispense Refill     oxyCODONE (ROXICODONE) 5 MG tablet Take 1 tablet (5 mg) by mouth every 8 hours as needed for severe pain 5 tablet 0     traZODone (DESYREL) 100 MG tablet Take  mg by mouth (Patient not taking: Reported on 8/20/2024)         Allergies:  No Known Allergies    Family history:  Family History   Problem Relation Age of Onset     Breast Cancer Other         Maternal Aunt     Breast Cancer Other         Maternal Aunt     Colon Cancer Other         Maternal Aunt     Asthma Brother      Coronary Artery Disease Father      Hypertension Father        Social history:  Social History     Tobacco Use     Smoking status: Never     Passive exposure: Never     Smokeless tobacco: Never   Substance Use Topics     Alcohol use: Yes     Comment: occasional     Marital status: .    Nursing Notes:   Sheri Rodas  8/20/2024  9:20 AM  Signed  Chief Complaint   Patient presents with     Post-op Visit       Vitals:    08/20/24 0916   BP: 119/82   BP Location: Right arm   Patient Position: Sitting   Cuff Size: Adult Regular   Pulse: 78   SpO2: 100%   Weight: 130 lb   Height: 5' 5\"       Body mass index is 21.63 kg/m .    BANDAR Duffy     15 minutes spent on the date of the encounter doing chart review, history and exam, documentation and further activities as noted above. "   This is a postop visit.    SULEIMAN Jose, NP-C  Colon and Rectal Surgery  [facility]    This note was created using speech recognition software and may contain unintended word substitutions.      Again, thank you for allowing me to participate in the care of your patient.      Sincerely,    SULEIMAN Jose CNP

## 2024-08-20 NOTE — PROGRESS NOTES
"Colon and Rectal Surgery Postoperative Clinic Note    RE: Elena Merrill  : 1995  JOSIAH: 2024    Elena Merrill is a very pleasant 29 year old female with anal fistula status post LIFT who subsequently developed an intersphincteric fistula and is now status post lay open fistulotomy on 24 by Dr. Mcdonough.    Interval history: Elena has been doing well. No significant pain and she feels fully healed. No bleeding or drainage. Is on a daily fiber supplement and no issues with bowel movements.    Physical Examination: Exam was chaperoned by Sheri Rodas, EMT and Dr. Claros, GI Fellow  /82 (BP Location: Right arm, Patient Position: Sitting, Cuff Size: Adult Regular)   Pulse 78   Ht 5' 5\"   Wt 130 lb   SpO2 100%   BMI 21.63 kg/m    General: alert, oriented, in no acute distress, sitting comfortably  HEENT: mucous membranes moist    Perianal external examination:  Fistulotomy site in the anterior position with good granulation tissue present    Digital rectal examination: Was deferred.    Anoscopy: Was deferred.    Assessment/Plan:  29 year old female with anal fistula status post LIFT who subsequently developed an intersphincteric fistula and is now status post lay open fistulotomy on 24 by Dr. Mcdonough. She is doing well. No pain, bleeding, or drainage. No difficulty with bowel movements. No activity restrictions. Can follow up as needed.    Medical history:  Past Medical History:   Diagnosis Date    Uncomplicated asthma     It was when I was younger.       Surgical history:  Past Surgical History:   Procedure Laterality Date    FISTULOTOMY RECTUM N/A 2023    Procedure: Exam under anesthesia and SETON PLACEMENT;  Surgeon: James Mcdonough MD;  Location: [facility] OR    FISTULOTOMY RECTUM N/A 2024    Procedure: FISTULOTOMY, RECTUM;  Surgeon: James Mcdonough MD;  Location: [facility] OR    LIGATION, FISTULA TRACT, INTERSPHINCTERIC N/A 2024    Procedure: LIGATION, FISTULA TRACT, " "INTERSPHINCTERIC;  Surgeon: James Mcdonough MD;  Location: [facility] OR       Problem list:    Patient Active Problem List    Diagnosis Date Noted    Transsphincteric anal fistula 08/06/2024     Priority: Medium    Anal fistula 10/23/2023     Priority: Medium       Medications:  Current Outpatient Medications   Medication Sig Dispense Refill    oxyCODONE (ROXICODONE) 5 MG tablet Take 1 tablet (5 mg) by mouth every 8 hours as needed for severe pain 5 tablet 0    traZODone (DESYREL) 100 MG tablet Take  mg by mouth (Patient not taking: Reported on 8/20/2024)         Allergies:  No Known Allergies    Family history:  Family History   Problem Relation Age of Onset    Breast Cancer Other         Maternal Aunt    Breast Cancer Other         Maternal Aunt    Colon Cancer Other         Maternal Aunt    Asthma Brother     Coronary Artery Disease Father     Hypertension Father        Social history:  Social History     Tobacco Use    Smoking status: Never     Passive exposure: Never    Smokeless tobacco: Never   Substance Use Topics    Alcohol use: Yes     Comment: occasional     Marital status: .    Nursing Notes:   Sheri Rodas  8/20/2024  9:20 AM  Signed  Chief Complaint   Patient presents with    Post-op Visit       Vitals:    08/20/24 0916   BP: 119/82   BP Location: Right arm   Patient Position: Sitting   Cuff Size: Adult Regular   Pulse: 78   SpO2: 100%   Weight: 130 lb   Height: 5' 5\"       Body mass index is 21.63 kg/m .    BANDAR Duffy     15 minutes spent on the date of the encounter doing chart review, history and exam, documentation and further activities as noted above.   This is a postop visit.    SULEIMAN Jose, NP-C  Colon and Rectal Surgery  [facility]    This note was created using speech recognition software and may contain unintended word substitutions.    "

## 2024-08-20 NOTE — NURSING NOTE
"Chief Complaint   Patient presents with    Post-op Visit       Vitals:    08/20/24 0916   BP: 119/82   BP Location: Right arm   Patient Position: Sitting   Cuff Size: Adult Regular   Pulse: 78   SpO2: 100%   Weight: 130 lb   Height: 5' 5\"       Body mass index is 21.63 kg/m .    Sheri Rodas, EMT  "

## 2024-08-26 NOTE — LETTER
"Anesthesia Transfer of Care Note    Patient: Eulondmelody Bright August    Procedure(s) Performed: Procedure(s) (LRB):  COLONOSCOPY (N/A)    Patient location: GI    Anesthesia Type: MAC    Transport from OR: Transported from OR on room air with adequate spontaneous ventilation    Post pain: adequate analgesia    Post assessment: no apparent anesthetic complications    Post vital signs: stable    Level of consciousness: responds to stimulation and sedated    Nausea/Vomiting: no nausea/vomiting    Complications: none    Transfer of care protocol was followed      Last vitals: Visit Vitals  /68   Pulse (!) 53   Temp 36.6 °C (97.9 °F)   Resp 18   Ht 5' 4" (1.626 m)   Wt 74.8 kg (165 lb)   SpO2 100%   Breastfeeding No   BMI 28.32 kg/m²     " 2024       RE: Elena Merrill   [address] [place] [address]       Dear Colleague,    Thank you for referring your patient, Elena Merrill, to the [facility] [place] at [facility]. Please see a copy of my visit note below.    Colon and Rectal Surgery Clinic Note    RE: Elena Merrill.  : 1995.  JOSIAH: 2024.    Reason for visit: anal fistula.    HPI: Elena Merrill is a 29 year old female who presents today for an anal fistula. She has a past medical history of asthma. I took her to the OR on 2024 for a seton placement. MRI pelvis on 2024 demonstrated a transsphincteric left perianal fistula. She underwent a LIFT on 2024. She met with Estefany Benedict NP yesterday. There was concern for a recurrent anal fistula.     Interval History: Experiencing some minor bleeding.    Medications:  Current Outpatient Medications   Medication Sig Dispense Refill    traZODone (DESYREL) 100 MG tablet Take  mg by mouth      ciprofloxacin (CIPRO) 500 MG tablet Take 1 tablet (500 mg) by mouth 2 times daily 14 tablet 0    metroNIDAZOLE (FLAGYL) 250 MG tablet Take 1 tablet (250 mg) by mouth 3 times daily 15 tablet 0       Allergies:  No Known Allergies    Social history:   Social History     Tobacco Use    Smoking status: Never     Passive exposure: Never    Smokeless tobacco: Never   Substance Use Topics    Alcohol use: Yes     Comment: occasional     Marital status: .    ROS:  A complete review of systems was performed with the patient and all systems negative except as per HPI.    Physical Examination:  Exam was chaperoned by Michael Bonner EMT-P  /75 (BP Location: Left arm, Patient Position: Sitting, Cuff Size: Adult Regular)   Pulse 78   SpO2 100%   General: Well hydrated. No acute distress.  Abdomen: Soft, NT. No inguinal adenopathy palpated.  Perianal external examination:  Well healed perineal incision. Minimal granulation tissue just on the inner part of the anal verge.    ASSESSMENT  This is a 29 year old F with a transsphincteric anal fistula s/p LIFT on 5/23/2024, now with likely intersphincteric fistula.This would require a fistulotomy. All questions were answered.  Risks, benefits, and alternatives of operative treatment were thoroughly discussed with the patient, he/she understands these well and agrees to proceed.    All pertinent labs and imaging were personally reviewed by me.    PLAN  - EUA with fistulotomy     30 minutes spent on the date of the encounter doing chart review, history and exam, imaging review, documentation and further activities as noted above.    Referring Provider:  No referring provider defined for this encounter.     Primary Care Provider:  Denisa Esposito, thank you for allowing me to participate in the care of your patient.      Sincerely,    James Mcdonough MD

## 2025-05-11 ENCOUNTER — HEALTH MAINTENANCE LETTER (OUTPATIENT)
Age: 30
End: 2025-05-11

## (undated) DEVICE — SURGICEL HEMOSTAT 4X8" 1952

## (undated) DEVICE — PANTIES MESH LG/XLG 2PK 706M2

## (undated) DEVICE — LINEN TOWEL PACK X5 5464

## (undated) DEVICE — SU VICRYL 2-0 UR-6 27" J602H

## (undated) DEVICE — GLOVE BIOGEL PI MICRO SZ 7.0 48570

## (undated) DEVICE — ESU GROUND PAD ADULT W/CORD E7507

## (undated) DEVICE — PAD CHUX UNDERPAD 30X36" P3036C

## (undated) DEVICE — GLOVE BIOGEL PI MICRO INDICATOR UNDERGLOVE SZ 7.5 48975

## (undated) DEVICE — SOL WATER IRRIG 500ML BOTTLE 2F7113

## (undated) DEVICE — DRSG GAUZE 4X4" TRAY 6939

## (undated) DEVICE — PREP POVIDONE IODINE SOLUTION 10% 4OZ BOTTLE 29906-004

## (undated) DEVICE — RX BACITRACIN OINTMENT 14G 0.5OZ 45802-060-01

## (undated) DEVICE — SU VICRYL 3-0 SH 27" UND J416H

## (undated) DEVICE — RETR RING LONE STAR 14.1X14.1CM 3307G

## (undated) DEVICE — DRSG KERLIX FLUFFS X5

## (undated) DEVICE — NDL ANGIOCATH 14GA 1.25" 4048

## (undated) DEVICE — SUCTION MANIFOLD NEPTUNE 2 SYS 1 PORT 702-025-000

## (undated) DEVICE — SU MONOCRYL 3-0 PS-2 18" UND Y497G

## (undated) DEVICE — SU VICRYL 0 UR-6 27" J603H

## (undated) DEVICE — PACK RECTAL KIT CUSTOM ASC

## (undated) DEVICE — TAPE DURAPORE 3" SILK 1538-3

## (undated) DEVICE — JELLY LUBRICATING SURGILUBE 2OZ TUBE

## (undated) DEVICE — SU SILK 0 TIE 6X30" A306H

## (undated) DEVICE — SU VICRYL 2-0 SH 27" UND J417H

## (undated) DEVICE — GLOVE PROTEXIS W/NEU-THERA 7.0  2D73TE70

## (undated) DEVICE — SOL HYDROGEN PEROXIDE 3% 4OZ BOTTLE F0010

## (undated) DEVICE — ESU ELEC BLADE 2.75" COATED/INSULATED E1455

## (undated) DEVICE — SU SILK 0 SH 30" K834H

## (undated) DEVICE — STRAP KNEE/BODY 31143004

## (undated) DEVICE — SYR 20ML LL W/O NDL 302830

## (undated) DEVICE — RETR ELASTIC STAYS LONE STAR SHARP 5MM 8/PACK 3311-8G

## (undated) DEVICE — VESSEL LOOPS YELLOW MAXI 31145694

## (undated) RX ORDER — ACETAMINOPHEN 325 MG/1
TABLET ORAL
Status: DISPENSED
Start: 2023-12-06

## (undated) RX ORDER — PROPOFOL 10 MG/ML
INJECTION, EMULSION INTRAVENOUS
Status: DISPENSED
Start: 2024-05-23

## (undated) RX ORDER — CEFAZOLIN SODIUM 2 G/50ML
SOLUTION INTRAVENOUS
Status: DISPENSED
Start: 2024-05-23

## (undated) RX ORDER — METRONIDAZOLE 500 MG/100ML
INJECTION, SOLUTION INTRAVENOUS
Status: DISPENSED
Start: 2024-05-23

## (undated) RX ORDER — ONDANSETRON 2 MG/ML
INJECTION INTRAMUSCULAR; INTRAVENOUS
Status: DISPENSED
Start: 2024-05-23

## (undated) RX ORDER — DEXAMETHASONE SODIUM PHOSPHATE 4 MG/ML
INJECTION, SOLUTION INTRA-ARTICULAR; INTRALESIONAL; INTRAMUSCULAR; INTRAVENOUS; SOFT TISSUE
Status: DISPENSED
Start: 2024-05-23

## (undated) RX ORDER — ONDANSETRON 2 MG/ML
INJECTION INTRAMUSCULAR; INTRAVENOUS
Status: DISPENSED
Start: 2024-08-07

## (undated) RX ORDER — HYDROMORPHONE HYDROCHLORIDE 1 MG/ML
INJECTION, SOLUTION INTRAMUSCULAR; INTRAVENOUS; SUBCUTANEOUS
Status: DISPENSED
Start: 2024-05-23

## (undated) RX ORDER — FENTANYL CITRATE 50 UG/ML
INJECTION, SOLUTION INTRAMUSCULAR; INTRAVENOUS
Status: DISPENSED
Start: 2024-05-23

## (undated) RX ORDER — ACETAMINOPHEN 325 MG/1
TABLET ORAL
Status: DISPENSED
Start: 2024-05-23

## (undated) RX ORDER — FENTANYL CITRATE 50 UG/ML
INJECTION, SOLUTION INTRAMUSCULAR; INTRAVENOUS
Status: DISPENSED
Start: 2023-12-06

## (undated) RX ORDER — DEXAMETHASONE SODIUM PHOSPHATE 4 MG/ML
INJECTION, SOLUTION INTRA-ARTICULAR; INTRALESIONAL; INTRAMUSCULAR; INTRAVENOUS; SOFT TISSUE
Status: DISPENSED
Start: 2024-08-07

## (undated) RX ORDER — FENTANYL CITRATE 50 UG/ML
INJECTION, SOLUTION INTRAMUSCULAR; INTRAVENOUS
Status: DISPENSED
Start: 2024-08-07

## (undated) RX ORDER — PROPOFOL 10 MG/ML
INJECTION, EMULSION INTRAVENOUS
Status: DISPENSED
Start: 2024-08-07